# Patient Record
Sex: FEMALE | Race: BLACK OR AFRICAN AMERICAN
[De-identification: names, ages, dates, MRNs, and addresses within clinical notes are randomized per-mention and may not be internally consistent; named-entity substitution may affect disease eponyms.]

---

## 2017-06-10 NOTE — PD
HPI


Chief Complaint:  Pregnancy Related Problem


Time Seen by Provider:  09:36


Travel History


International Travel<30 days:  No


Contact w/Intl Traveler<30days:  No


Traveled to known affect area:  No





History of Present Illness


HPI


Patient is a 31-year-old  at approximately 10 weeks' gestational age based 

on early first trimester ultrasound here with complaint of nausea and vomiting.

  Patient has had hyperemesis with her previous pregnancies.  States she is 

followed by OB/GYN Dr. Graf, who prescribe Zofran 4 mg every 6 hours.  She 

has been taking this without much improvement and notes persistent nausea and 

vomiting.  She has not had any abdominal pain, vaginal bleeding or leakage of 

fluid.





PFSH


Past Medical History


Medical History:  Denies Significant Hx


Hx Anticoagulant Therapy:  No


Asthma:  Yes (CHILDHOOD)


Cardiovascular Problems:  No


Chemotherapy:  No


Cerebrovascular Accident:  No


Diabetes:  No


Diminished Hearing:  No


Respiratory:  No


Tetanus Vaccination:  > 5 Years


Influenza Vaccination:  No


Pregnant?:  Pregnant


:  2


Para:  2


Miscarriage:  0


:  0


Ectopic Pregnancy:  No


Tubal Ligation:  No





Past Surgical History


Surgical History:  No Previous Surgery


Genitourinary Surgery:  Yes (BARTHOLIN CYST; SEEMS NOT TO HAVE BEEN REMOVED)


Hysterectomy:  No





Social History


Alcohol Use:  No


Tobacco Use:  No


Substance Use:  No





Allergies-Medications


(Allergen,Severity, Reaction):  


Coded Allergies:  


     No Known Allergies (Verified , 16)


Reported Meds & Prescriptions





Reported Meds & Active Scripts


Active


Diclegis (Doxylamine-Pyridoxine) 10-10 Mg Tab 1 Tab PO AS DIRECTED 30 Days


     2 tab po hs day 1. If symptoms persist, 1 tab in am + 2 tab hs day 2.


     If symptoms persist, 1 tab in am + 1 tab noon + 2 tab hs day 3.


Phenergan (Promethazine HCl) 25 Mg Tablet 25 Mg PO Q6H PRN








Review of Systems


Except as stated in HPI:  all other systems reviewed are Neg





Physical Exam


Narrative


GENERAL: Well-appearing  female in no acute distress


SKIN: Focused skin assessment warm/dry.


HEAD:  Normocephalic. 


EYES: No scleral icterus. No injection or drainage. 


ENT:  Mucous membranes pink and moist.


CARDIOVASCULAR: Regular rate and rhythm


RESPIRATORY: No accessory muscle use.


GASTROINTESTINAL: Abdomen soft, non-tender, nondistended. 


MUSCULOSKELETAL: Normal gait


NEUROLOGICAL: Awake and alert.  Normal speech.


PSYCHIATRIC: Appropriate mood and affect; insight and judgment normal.





Data


Data


Last Documented VS





Vital Signs








  Date Time  Temp Pulse Resp B/P Pulse Ox O2 Delivery O2 Flow Rate FiO2


 


6/10/17 09:36  87 18 132/60 98 Room Air  


 


6/10/17 09:30 98.8       








Orders





 Promethazine (Phenergan) (6/10/17 09:45)








MDM


Medical Decision Making


Medical Screen Exam Complete:  Yes


Emergency Medical Condition:  Yes


Medical Record Reviewed:  Yes


Differential Diagnosis


31-year-old  at approximately 10 weeks' gestational age here with 

complaint of nausea, vomiting.  Differential includes pregnancy associated 

nausea, hyperemesis gravidarum.  She does not have any cyst and abdominal pain, 

fevers, chills or other systemic symptoms to suggest infection or peritoneal 

pathology.


Narrative Course


Patient given dose of oral Phenergan here with improvement of her symptoms.  We'

ll discharge to home with Phenergan, Diciglis alternating with Zofran when 

necessary for nausea vomiting outpatient BUN follow-up.





Diagnosis





 Primary Impression:  


 Hyperemesis gravidarum


Referrals:  


GOPAL Graf MD


call for appointment





***Additional Instructions:


Zofran, Phenergan as needed for nausea and vomiting.


Diclegis as prescribed.  Follow-up with OB/GYN as discussed.


***Med/Other Pt SpecificInfo:  Prescription(s) given


Scripts


Doxylamine-Pyridoxine (Diclegis)10-10 Mg Tab1 Tab PO AS DIRECTED  30 Days


   2 tab po hs day 1. If symptoms persist, 1 tab in am + 2 tab hs day 2.


   If symptoms persist, 1 tab in am + 1 tab noon + 2 tab hs day 3.


   Prov:Shiloh Marques MD         6/10/17 


Promethazine (Phenergan)25 Mg [id number] Mg PO Q6H PRN (NAUSEA OR VOMITING) #10 

TAB  Ref 0


   Prov:Shiloh Marques MD         6/10/17


Disposition:  01 DISCHARGE HOME


Condition:  Stable








Shiloh Marques MD Dayron 10, 2017 09:49

## 2017-07-16 ENCOUNTER — HOSPITAL ENCOUNTER (EMERGENCY)
Dept: HOSPITAL 17 - NEPK | Age: 32
Discharge: HOME | End: 2017-07-16
Payer: MEDICAID

## 2017-07-16 VITALS
SYSTOLIC BLOOD PRESSURE: 125 MMHG | HEART RATE: 78 BPM | TEMPERATURE: 99 F | RESPIRATION RATE: 20 BRPM | OXYGEN SATURATION: 99 % | DIASTOLIC BLOOD PRESSURE: 59 MMHG

## 2017-07-16 VITALS — WEIGHT: 171.96 LBS | HEIGHT: 60 IN | BODY MASS INDEX: 33.76 KG/M2

## 2017-07-16 DIAGNOSIS — Z79.899: ICD-10-CM

## 2017-07-16 DIAGNOSIS — J45.909: ICD-10-CM

## 2017-07-16 DIAGNOSIS — Z3A.15: ICD-10-CM

## 2017-07-16 DIAGNOSIS — M25.511: ICD-10-CM

## 2017-07-16 DIAGNOSIS — G89.29: ICD-10-CM

## 2017-07-16 DIAGNOSIS — O26.899: Primary | ICD-10-CM

## 2017-07-16 PROCEDURE — 99282 EMERGENCY DEPT VISIT SF MDM: CPT

## 2017-07-16 NOTE — PD
HPI


.


right shoulder pain


Chief Complaint:  Pain: Acute or Chronic


Time Seen by Provider:  18:14


Travel History


International Travel<30 days:  No


Contact w/Intl Traveler<30days:  No


Traveled to known affect area:  No





History of Present Illness


HPI


31-year-old female here with complaints of left shoulder pain, but is pointing 

to her right.  When we asked her which shoulder, she once again pointed to her 

right but was telling me her left.  I pointed that out to her and she tells me 

that several years ago she popped out her right shoulder and it has been 

hurting more than usual recently.  Of note she is 15 weeks pregnant, .  

She tells me Tylenol is not helping her pain and she would like something 

stronger.  She also wants to know if her shoulder is dislocated.





PFSH


Past Medical History


Hx Anticoagulant Therapy:  No


Asthma:  Yes (CHILDHOOD)


Cardiovascular Problems:  No


Chemotherapy:  No


Cerebrovascular Accident:  No


Diabetes:  No


Diminished Hearing:  No


Respiratory:  No


:  2


Para:  2


Miscarriage:  0


:  0


Ectopic Pregnancy:  No


Tubal Ligation:  No





Past Surgical History


Genitourinary Surgery:  Yes (BARTHOLIN CYST; SEEMS NOT TO HAVE BEEN REMOVED)


Hysterectomy:  No





Social History


Alcohol Use:  No


Tobacco Use:  No


Substance Use:  No





Allergies-Medications


(Allergen,Severity, Reaction):  


Coded Allergies:  


     No Known Allergies (Verified , 16)


Reported Meds & Prescriptions





Reported Meds & Active Scripts


Active


Diclegis (Doxylamine-Pyridoxine) 10-10 Mg Tab 1 Tab PO AS DIRECTED 30 Days


     2 tab po hs day 1. If symptoms persist, 1 tab in am + 2 tab hs day 2.


     If symptoms persist, 1 tab in am + 1 tab noon + 2 tab hs day 3.


Phenergan (Promethazine HCl) 25 Mg Tablet 25 Mg PO Q6H PRN








Review of Systems


General / Constitutional:  No: Fever


Eyes:  No: Visual changes


HENT:  No: Headaches


Cardiovascular:  No: Chest Pain or Discomfort


Respiratory:  No: Shortness of Breath


Gastrointestinal:  No: Abdominal Pain


Genitourinary:  No: Dysuria


Musculoskeletal:  Positive: Pain (right shoulder pain )


Skin:  No Rash


Neurologic:  No: Weakness


Psychiatric:  No: Depression


Endocrine:  No: Polydipsia


Hematologic/Lymphatic:  No: Easy Bruising





Physical Exam


Narrative


GENERAL: AAO x 3, no acute distress, Well-nourished, well-developed patient.


SKIN: Warm and dry. No visible rashes or bruising. 


HEAD: Normocephalic and atraumatic.


EYES: No scleral icterus. No injection or drainage. 


ENT: No nasal drainage noted. Mucous membranes pink. Airway patent. 


NECK: Supple, trachea midline. No JVD.


CARDIOVASCULAR: Regular rate and rhythm without murmurs, gallops, or rubs. 


RESPIRATORY: Breath sounds equal bilaterally. No accessory muscle use. No 

rhonchi or rales. 


GASTROINTESTINAL:visual inspection normal 


EXTREMITIES: No cyanosis or edema. Can cross both arms across chest, ROM b/l 

shoulder normal. 


BACK: No obvious deformity.


NEURO: CN II-12 intact, 


PSYCH: AAO x 3, normal affect.





Data


Data


Last Documented VS





Vital Signs








  Date Time  Temp Pulse Resp B/P Pulse Ox O2 Delivery O2 Flow Rate FiO2


 


17 17:56 99.0 78 20 125/59 99 Room Air  











MDM


Medical Decision Making


Medical Screen Exam Complete:  Yes


Emergency Medical Condition:  Yes


Medical Record Reviewed:  Yes


Differential Diagnosis


Acute on chronic shoulder pain, less likely dislocation, less likely fracture


Narrative Course


31-year-old female here with acute on chronic shoulder pain.


Patient appears comfortable and is in no distress.  Examination is unremarkable.


I advised her she can use Tylenol or speak with her OB/GYN about stronger pain 

medication.





I've explained to her that ultimately there is nothing else I can do in the 

emergency room.  She does not have a shoulder dislocation and does not require 

further treatment.





Patient verbalized understanding of instructions, questions were answered, and 

thanked me for their care. I advised them if their condition worsens, please 

return to the nearest emergency room for further care.





Diagnosis





 Primary Impression:  


 Shoulder pain, right


 Qualified Code:  M25.511 - Chronic right shoulder pain


Patient Instructions:  General Instructions





***Additional Instructions:


Follow-up with her primary care provider.





Follow-up with your OB/GYN.


***Med/Other Pt SpecificInfo:  No Change to Meds


Disposition:  01 DISCHARGE HOME


Condition:  Stable








Lilliana Miles 2017 18:15

## 2017-08-02 ENCOUNTER — HOSPITAL ENCOUNTER (EMERGENCY)
Dept: HOSPITAL 17 - PHED | Age: 32
Discharge: HOME | End: 2017-08-02
Payer: MEDICAID

## 2017-08-02 VITALS — HEIGHT: 60 IN | WEIGHT: 220.46 LBS | BODY MASS INDEX: 43.28 KG/M2

## 2017-08-02 VITALS
TEMPERATURE: 98.4 F | SYSTOLIC BLOOD PRESSURE: 108 MMHG | OXYGEN SATURATION: 100 % | HEART RATE: 68 BPM | RESPIRATION RATE: 16 BRPM | DIASTOLIC BLOOD PRESSURE: 57 MMHG

## 2017-08-02 DIAGNOSIS — R42: ICD-10-CM

## 2017-08-02 DIAGNOSIS — R82.71: ICD-10-CM

## 2017-08-02 DIAGNOSIS — Z3A.17: ICD-10-CM

## 2017-08-02 DIAGNOSIS — O26.892: Primary | ICD-10-CM

## 2017-08-02 LAB
ALP SERPL-CCNC: 60 U/L (ref 45–117)
ALT SERPL-CCNC: 19 U/L (ref 10–53)
ANION GAP SERPL CALC-SCNC: 9 MEQ/L (ref 5–15)
AST SERPL-CCNC: 14 U/L (ref 15–37)
BACTERIA #/AREA URNS HPF: (no result) /HPF
BASOPHILS # BLD AUTO: 0 TH/MM3 (ref 0–0.2)
BASOPHILS NFR BLD: 0.4 % (ref 0–2)
BILIRUB SERPL-MCNC: 0.3 MG/DL (ref 0.2–1)
BUN SERPL-MCNC: 8 MG/DL (ref 7–18)
CHLORIDE SERPL-SCNC: 107 MEQ/L (ref 98–107)
COLOR UR: YELLOW
COMMENT (UR): (no result)
CULTURE IF INDICATED: (no result)
EOSINOPHIL # BLD: 0.2 TH/MM3 (ref 0–0.4)
EOSINOPHIL NFR BLD: 2 % (ref 0–4)
ERYTHROCYTE [DISTWIDTH] IN BLOOD BY AUTOMATED COUNT: 13.4 % (ref 11.6–17.2)
GFR SERPLBLD BASED ON 1.73 SQ M-ARVRAT: 140 ML/MIN (ref 89–?)
GLUCOSE UR STRIP-MCNC: (no result) MG/DL
HCO3 BLD-SCNC: 24 MEQ/L (ref 21–32)
HCT VFR BLD CALC: 35.3 % (ref 35–46)
HEMO FLAGS: (no result)
HGB UR QL STRIP: (no result)
KETONES UR STRIP-MCNC: (no result) MG/DL
LEUKOCYTE ESTERASE UR QL STRIP: (no result) /HPF (ref 0–5)
LYMPHOCYTES # BLD AUTO: 3.4 TH/MM3 (ref 1–4.8)
LYMPHOCYTES NFR BLD AUTO: 28.7 % (ref 9–44)
MCH RBC QN AUTO: 30 PG (ref 27–34)
MCHC RBC AUTO-ENTMCNC: 33.1 % (ref 32–36)
MCV RBC AUTO: 90.7 FL (ref 80–100)
MONOCYTES NFR BLD: 5.6 % (ref 0–8)
NEUTROPHILS # BLD AUTO: 7.6 TH/MM3 (ref 1.8–7.7)
NEUTROPHILS NFR BLD AUTO: 63.3 % (ref 16–70)
NITRITE UR QL STRIP: (no result)
PLATELET # BLD: 342 TH/MM3 (ref 150–450)
POTASSIUM SERPL-SCNC: 3.8 MEQ/L (ref 3.5–5.1)
RBC # BLD AUTO: 3.89 MIL/MM3 (ref 4–5.3)
RBC #/AREA URNS HPF: (no result) /HPF (ref 0–3)
SODIUM SERPL-SCNC: 140 MEQ/L (ref 136–145)
SP GR UR STRIP: 1.03 (ref 1–1.03)
SQUAMOUS #/AREA URNS HPF: (no result) /HPF (ref 0–5)
WBC # BLD AUTO: 11.9 TH/MM3 (ref 4–11)

## 2017-08-02 PROCEDURE — 81001 URINALYSIS AUTO W/SCOPE: CPT

## 2017-08-02 PROCEDURE — 85025 COMPLETE CBC W/AUTO DIFF WBC: CPT

## 2017-08-02 PROCEDURE — 80053 COMPREHEN METABOLIC PANEL: CPT

## 2017-08-02 PROCEDURE — 96360 HYDRATION IV INFUSION INIT: CPT

## 2017-08-02 PROCEDURE — 99284 EMERGENCY DEPT VISIT MOD MDM: CPT

## 2017-08-02 NOTE — PD
HPI


Chief Complaint:  Pregnancy Related Problem


Time Seen by Provider:  12:30


Travel History


International Travel<30 days:  No


Contact w/Intl Traveler<30days:  No


Traveled to known affect area:  No





History of Present Illness


HPI


This is a 31-year-old female who presents to the emergency department 17 weeks 

pregnant not feeling well.  She says that at work her lower abdomen started to 

cramp and she started to feel lightheaded, moderate severity, constant.  She 

says she's been having these symptoms for 3 weeks.  She told her obstetrician 

about it.  He didn't say much.  She says during her second pregnancy she also 

had similar symptoms.  She just says she feels really tired and doesn't feel 

well.  She denies any vaginal bleeding, dysuria, diarrhea, vaginal discharge or 

fever





PFSH


Past Medical History


Hx Anticoagulant Therapy:  No


Asthma:  Yes (CHILDHOOD)


Cardiovascular Problems:  No


Chemotherapy:  No


Cerebrovascular Accident:  No


Diabetes:  No


Diminished Hearing:  No


Respiratory:  No


Pregnant?:  Pregnant


LMP:  17 WEEKS PREGNANT


:  3


Para:  2


Miscarriage:  0


:  0


Ectopic Pregnancy:  No


Tubal Ligation:  No





Past Surgical History


Genitourinary Surgery:  Yes (BARTHOLIN CYST; SEEMS NOT TO HAVE BEEN REMOVED)


Hysterectomy:  No





Social History


Alcohol Use:  No


Tobacco Use:  No


Substance Use:  No





Allergies-Medications


(Allergen,Severity, Reaction):  


Coded Allergies:  


     No Known Allergies (Verified , 17)


Reported Meds & Prescriptions





Reported Meds & Active Scripts


Active


Diclegis (Doxylamine-Pyridoxine) 10-10 Mg Tab 1 Tab PO AS DIRECTED 30 Days


     2 tab po hs day 1. If symptoms persist, 1 tab in am + 2 tab hs day 2.


     If symptoms persist, 1 tab in am + 1 tab noon + 2 tab hs day 3.


Reported


Prenatal Dha (Docosahexaenoic Acid) 200 Mg Cap   








Review of Systems


Except as stated in HPI:  all other systems reviewed are Neg





Physical Exam


Narrative


GENERAL:Well appearing, no acute distress


SKIN: Focused skin assessment warm and dry.


HEAD: Atraumatic. Normocephalic. 


EYES: Pupils equal and round.  No injection or drainage. 


ENT:  Moist mucous membranes


NECK: Trachea midline. 


CARDIOVASCULAR: Regular rate and rhythm.  No murmur appreciated.


RESPIRATORY: Clear to auscultation. Breath sounds equal bilaterally. 


GASTROINTESTINAL: Abdomen soft, mildly tender to palpation in the lower abdomen 

with no rebound or guarding.


MUSCULOSKELETAL: No obvious deformities. 


NEUROLOGICAL: Awake and alert. No obvious cranial nerve deficits.  Moving all 

extremities.


PSYCHIATRIC: Appropriate mood and affect; insight and judgment normal.





Data


Data


Last Documented VS





Vital Signs








  Date Time  Temp Pulse Resp B/P Pulse Ox O2 Delivery O2 Flow Rate FiO2


 


17 12:23 98.4 68 16 108/57 100   








Orders





 Complete Blood Count With Diff (17 12:40)


Comprehensive Metabolic Panel (17 12:40)


^ Insert Iv (17 12:40)


Sodium Chlor 0.9% 1000 Ml Inj (Ns 1000 M (17 12:45)


Urinalysis - C+S If Indicated (17 12:40)


Ed Poc Ultrasound (17 )





Labs








 Laboratory Tests








Test 17





 12:50


 


White Blood Count 11.9 TH/MM3


 


Red Blood Count 3.89 MIL/MM3


 


Hemoglobin 11.7 GM/DL


 


Hematocrit 35.3 %


 


Mean Corpuscular Volume 90.7 FL


 


Mean Corpuscular Hemoglobin 30.0 PG


 


Mean Corpuscular Hemoglobin 33.1 %





Concent 


 


Red Cell Distribution Width 13.4 %


 


Platelet Count 342 TH/MM3


 


Mean Platelet Volume 8.0 FL


 


Neutrophils (%) (Auto) 63.3 %


 


Lymphocytes (%) (Auto) 28.7 %


 


Monocytes (%) (Auto) 5.6 %


 


Eosinophils (%) (Auto) 2.0 %


 


Basophils (%) (Auto) 0.4 %


 


Neutrophils # (Auto) 7.6 TH/MM3


 


Lymphocytes # (Auto) 3.4 TH/MM3


 


Monocytes # (Auto) 0.7 TH/MM3


 


Eosinophils # (Auto) 0.2 TH/MM3


 


Basophils # (Auto) 0.0 TH/MM3


 


CBC Comment DIFF FINAL 


 


Differential Comment  


 


Urine Color YELLOW 


 


Urine Turbidity CLEAR 


 


Urine pH 6.0 


 


Urine Specific Gravity 1.031 


 


Urine Protein NEG mg/dL


 


Urine Glucose (UA) NEG mg/dL


 


Urine Ketones NEG mg/dL


 


Urine Occult Blood NEG 


 


Urine Nitrite NEG 


 


Urine Bilirubin NEG 


 


Urine Leukocyte Esterase NEG 


 


Urine RBC 0-3 /hpf


 


Urine WBC 0-2 /hpf


 


Urine Squamous Epithelial 0-5 /hpf





Cells 


 


Urine Amorphous Sediment FEW 


 


Urine Bacteria FEW /hpf


 


Microscopic Urinalysis Comment CULT NOT





 INDICATED


 


Sodium Level 140 MEQ/L


 


Potassium Level 3.8 MEQ/L


 


Chloride Level 107 MEQ/L


 


Carbon Dioxide Level 24.0 MEQ/L


 


Anion Gap 9 MEQ/L


 


Blood Urea Nitrogen 8 MG/DL


 


Creatinine 0.60 MG/DL


 


Estimat Glomerular Filtration 140 ML/MIN





Rate 


 


Random Glucose 69 MG/DL


 


Calcium Level 9.0 MG/DL


 


Total Bilirubin 0.3 MG/DL


 


Aspartate Amino Transf 14 U/L





(AST/SGOT) 


 


Alanine Aminotransferase 19 U/L





(ALT/SGPT) 


 


Alkaline Phosphatase 60 U/L


 


Total Protein 7.1 GM/DL


 


Albumin 2.8 GM/DL














MDM


Medical Decision Making


Medical Screen Exam Complete:  Yes


Emergency Medical Condition:  Yes


Interpretation(s)


Afebrile, no tachycardia, normotensive


Mild leukocytosis


Electrolytes are reassuring


Urinalysis: Few bacteria


Differential Diagnosis


Dehydration, electrolyte abnormality, urinary tract infection


Narrative Course


This is a 31-year-old female who presents to the emergency department with 

fatigue, lightheadedness and abdominal discomfort in the setting of pregnancy.  

She says she's been having these symptoms for 3 weeks and she had them in her 

prior pregnancy as well.  I don't suspect a surgical etiology for her symptoms 

and she is quite well-appearing.  Labs are obtained which are reassuring.  She 

does have a few bacteria on urinalysis so I'll treat her with antibiotics.  She 

is given a liter of fluids and feels much better.  I think she can be 

discharged home to follow up with her obstetrician.





Procedures


**Procedure Narrative**


Point-of-care ultrasound: Active intrauterine fetus with a heart rate of 132





Diagnosis





 Primary Impression:  


 Pregnancy


 Qualified Code:  Z3A.17 - 17 weeks gestation of pregnancy


 Additional Impression:  


 Asymptomatic bacteriuria


Patient Instructions:  General Instructions





***Additional Instructions:


If you develop fever, persistent vomiting, back pain, or inability to eat 

return to the emergency department as your urine infection may have progressed 

to a kidney infection.


Complete your antibiotics as prescribed.


Stay well hydrated with Gatorade or water.


Followup with your primary care physician in 2-3 days if your symptoms have not 

resolved.


***Med/Other Pt SpecificInfo:  Prescription(s) given


Scripts


Cephalexin (Keflex)500 Mg [id number] Mg PO Q12H  7 Days  Ref 0


   Prov:Oliva Armas MD         17


Disposition:  01 DISCHARGE HOME


Condition:  Stable








Oliva Armas MD Aug 2, 2017 12:50

## 2017-09-20 ENCOUNTER — HOSPITAL ENCOUNTER (EMERGENCY)
Dept: HOSPITAL 17 - HOBED | Age: 32
Discharge: HOME | End: 2017-09-20
Payer: MEDICAID

## 2017-09-20 DIAGNOSIS — M54.5: ICD-10-CM

## 2017-09-20 DIAGNOSIS — O21.9: Primary | ICD-10-CM

## 2017-09-20 DIAGNOSIS — R10.30: ICD-10-CM

## 2017-09-20 PROCEDURE — 96374 THER/PROPH/DIAG INJ IV PUSH: CPT

## 2017-09-20 PROCEDURE — 99284 EMERGENCY DEPT VISIT MOD MDM: CPT

## 2017-09-20 NOTE — PD
HPI


Chief Complaint


Cramping pain lower abdominal and low back pain nausea and vomiting


Date Seen:  Sep 20, 2017


Time Seen:  11:10


Travel History


International Travel<30 Days:  No


Contact w/Intl Traveler<30Days:  No


Known Affected Area:  No





History of Present Illness


HPI


Patient is 32-year-old black female  24 weeks sees Dr. Graf for 

prenatal care she presents complaining of long-term lower abdominal pain and 

cramping for months and also lower back pain long-term but today and yesterday 

particularly bad.  She denies leakage or bleeding.  The fetal heart tones are 

reactive she is not jenna, urine dip is negative, significant nausea and 

vomiting throughout her pregnancy is unlikely just at home that's her only 

medication.  She tried some Tylenol as well as home today


Weeks Gestation:  24


Para:  2


:  3





History


Obstetric History


Obstetric History


2 vaginal deliveries





Social History


Alcohol Use:  No


Tobacco Use:  No


Substance Abuse:  No





Allergies-Medications


(Allergen,Severity, Reaction):  


Coded Allergies:  


     No Known Allergies (Verified , 17)


Home Meds


Active Scripts


Cephalexin (Keflex) 500 Mg Cap, 500 MG PO Q12H for Infection for 7 Days, CAP 0 

Refills


   Prov:Oliva Armas MD         17


Doxylamine-Pyridoxine (Diclegis) 10-10 Mg Tab, 1 TAB PO AS DIRECTED for 30 Days


   2 tab po hs day 1. If symptoms persist, 1 tab in am + 2 tab hs day 2.


   If symptoms persist, 1 tab in am + 1 tab noon + 2 tab hs day 3.


   Prov:Shiloh Marques MD         6/10/17


Reported Medications


Docosahexaenoic Acid (Prenatal Dha) 200 Mg Cap


   17





Review of Systems


General / Constitutional:  No: Fever, Weight Gain, Chills, Other


Eyes:  No: Diploplia, Blurred Vision, Visual changes, Pain, Photophobia


HENT:  No: Headaches, Vertigo, Lightheadedness


Cardiovascular:  No: Irregular Rhythm, Chest Pain or Discomfort, Palpitations, 

Tachycardia, Syncope, Varicosities, Edema, Cyanosis


Respiratory:  No: Cough, Short of Breath, Other


Gastrointestinal:  Nausea, Vomiting, Abdominal Pain, No: Diarrhea


Genitourinary:  No: Decreased Urinary Output, Oliguria


Musculoskeletal:  No: Limited ROM, Weakness, Cramping, Edema, Pain


Skin:  No Rash, No Itching, No Dryness, No Lumps, No Change in Pigmentation, No 

Change in Nails, No Alopecia, No Lesions


Neurologic:  No: Weakness, Dizziness, Syncope, Focal Abnormalities, 

Coordination Problem, Headache, Slurred Speech, Seizures


Psychiatric:  No: Depression, Suicidal Ideations, Homicidal Ideation


Endocrine:  No: Heat Intolerance, Cold Intolerance, Polydipsia, Polyuria, Other





Physical Exam


Narrative


GENERAL: Well-nourished, well-developed patient.


SKIN: Warm and dry.


HEAD: Normocephalic and atraumatic.


EYES: No scleral icterus. No injection or drainage. 


ENT: No nasal drainage noted. Mucous membranes pink. Airway patent.


NECK: Supple, trachea midline. No JVD.


CARDIOVASCULAR: Regular rate and rhythm without murmurs, gallops, or rubs. 


RESPIRATORY: Breath sounds equal bilaterally. No accessory muscle use.


BREASTS: Bilateral exam showed no masses , no retractions, no nipple discharge.


ABDOMEN/GI: Abdomen soft, non-tender, bowel sounds present, no rebound, no 

guarding 


   Gravid to [-24] weeks size


   Fundal Height: [24-]


GENITOURINARY: 


   External Genitalia: intact and normal in appearance


   BUS glands: [-]


   Cervix: [-]


   Dilatation: [Closed-]          


   Effacement: [-]  Thick        


   Station: [-3]  


    -]        


   Membranes: [intact  ]


   Uterine Contractions: [none-]


FHT's: 


   Category: [1-]   


   Baseline: [133-]   


   Reactive: [yes-]   


   Variability: [-mod]  


   Decels: [-none]  


EXTREMITIES: No cyanosis or edema.


BACK: Nontender without obvious deformity. No CVA tenderness.


NEUROLOGICAL: Awake and alert. Motor and sensory grossly within normal limits. 

Five out of 5 muscle strength in all muscle groups. Normal speech.





Data


Data


Orders





 Orders


Vital Signs (Adult) .ON ADMISSION (17 11:12)


^ Labor Status (17 11:12)


Lactated Ringer's 1000 Ml Inj (Lr 1000 M (17 11:12)


Ondansetron Inj (Zofran Inj) (17 11:15)


Fentanyl Inj (Fentanyl Inj) (17 11:15)


Labs


Urine dip positive for ketones otherwise negative





MDM


Interpretation(s)


Patient is 32-year-old black female  at 24 weeks presents complaining of 

lower abdominal pain and low back pain and nausea and vomiting.  She has no 

bleeding or leakage of fluid no contractions and fetal heart rate is reactive, 

cervix is closed thick and high, urinalysis is negative on dipstick, patient's 

had the similar cramps and low back pain the whole pregnancy assisted was 

little worse yesterday.  She took some Tylenol and did not get better


Plan


Plan is IV hydrate IV Zofran.  The patient wants pain medication she'll find a 

ride home she drove herself to the hospital.  Home prescriptions for Phenergan 

by mouth and suppository.  Upper follow-up with Dr. Graf in the usual 

fashion sooner when necessary symptoms


Diagnosis


Diagnosis:  


 Primary Impression:  


 Abdominal pain during pregnancy in second trimester


 Additional Impression:  


 Nausea and vomiting of pregnancy, antepartum


Disposition:  01 DISCHARGE HOME


Condition:  Stable


Scripts


Promethazine Supp (Phenergan Supp) 25 Mg Supp


25 MG RECTAL Q6H Y for NAUSEA OR VOMITING, #6 SUPP 0 Refills


   Prov: Bar Meyers II, MD         17 


Promethazine (Phenergan) 25 Mg Tablet


25 MG PO Q6H Y for NAUSEA OR VOMITING, #30 TAB 0 Refills


   Prov: Bar Meyers II, MD         17











Bar Meyers II, MD Sep 20, 2017 11:22

## 2017-10-03 ENCOUNTER — HOSPITAL ENCOUNTER (EMERGENCY)
Dept: HOSPITAL 17 - HOBED | Age: 32
Discharge: HOME | End: 2017-10-03
Payer: MEDICAID

## 2017-10-03 DIAGNOSIS — Z3A.26: ICD-10-CM
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
## 2017-10-03 DIAGNOSIS — O9A.212: Primary | ICD-10-CM

## 2017-10-03 PROCEDURE — 99283 EMERGENCY DEPT VISIT LOW MDM: CPT

## 2017-10-03 NOTE — PD
HPI


Chief Complaint


Patient fell at home against the bathtub


Date Seen:  Oct 3, 2017


Time Seen:  19:25


Travel History


International Travel<30 Days:  No


Contact w/Intl Traveler<30Days:  No


Known Affected Area:  No





History of Present Illness


HPI


Patient is 32-year-old black female  at 26 weeks sees Dr. Graf she 

presents after having fallen at home and hit her side against the bathtub.  She 

has had some tenderness now but has had no bleeding or leakage of fluid.  Fetal 

heart tones are within normal limits 26 weeks sees a little hard to monitor.  

No contractions seen


Weeks Gestation:  26


Para:  2


:  3





History


Obstetric History


Obstetric History


2 vaginal deliveries





Social History


Alcohol Use:  No


Tobacco Use:  No


Substance Abuse:  No





Allergies-Medications


(Allergen,Severity, Reaction):  


Coded Allergies:  


     No Known Allergies (Verified , 17)


Home Meds


Active Scripts


Promethazine Supp (Phenergan Supp) 25 Mg Supp, 25 MG RECTAL Q6H Y for NAUSEA OR 

VOMITING, #6 SUPP 0 Refills


   Prov:Bar Meyers II, MD         17


Promethazine (Phenergan) 25 Mg Tablet, 25 MG PO Q6H Y for NAUSEA OR VOMITING, #

30 TAB 0 Refills


   Prov:Bar Meyers II, MD         17


Cephalexin (Keflex) 500 Mg Cap, 500 MG PO Q12H for Infection for 7 Days, CAP 0 

Refills


   Prov:Oliva Armas MD         17


Doxylamine-Pyridoxine (Diclegis) 10-10 Mg Tab, 1 TAB PO AS DIRECTED for 30 Days


   2 tab po hs day 1. If symptoms persist, 1 tab in am + 2 tab hs day 2.


   If symptoms persist, 1 tab in am + 1 tab noon + 2 tab hs day 3.


   Prov:Shiloh Marques MD         6/10/17


Reported Medications


Docosahexaenoic Acid (Prenatal Dha) 200 Mg Cap


   17





Review of Systems


General / Constitutional:  No: Fever, Weight Gain, Chills, Other


Eyes:  No: Diploplia, Blurred Vision, Visual changes, Pain, Photophobia


HENT:  No: Headaches, Vertigo, Lightheadedness


Cardiovascular:  No: Irregular Rhythm, Chest Pain or Discomfort, Palpitations, 

Tachycardia, Syncope, Varicosities, Edema, Cyanosis


Respiratory:  No: Cough, Short of Breath, Other


Gastrointestinal:  No: Nausea, Vomiting, Diarrhea


Genitourinary:  No: Decreased Urinary Output, Oliguria


Musculoskeletal:  No: Limited ROM, Weakness, Cramping, Edema, Pain


Skin:  No Rash, No Itching, No Dryness, No Lumps, No Change in Pigmentation, No 

Change in Nails, No Alopecia, No Lesions


Neurologic:  No: Weakness, Dizziness, Syncope, Focal Abnormalities, 

Coordination Problem, Headache, Slurred Speech, Seizures


Psychiatric:  No: Depression, Suicidal Ideations, Homicidal Ideation


Endocrine:  No: Heat Intolerance, Cold Intolerance, Polydipsia, Polyuria, Other





Physical Exam


Narrative


GENERAL: Well-nourished, well-developed obese patient.


SKIN: Warm and dry.


HEAD: Normocephalic and atraumatic.


EYES: No scleral icterus. No injection or drainage. 


ENT: No nasal drainage noted. Mucous membranes pink. Airway patent.


NECK: Supple, trachea midline. No JVD.


CARDIOVASCULAR: Regular rate and rhythm without murmurs, gallops, or rubs. 


RESPIRATORY: Breath sounds equal bilaterally. No accessory muscle use.


BREASTS: Bilateral exam showed no masses , no retractions, no nipple discharge.


ABDOMEN/GI: Abdomen soft, non-tender, bowel sounds present, no rebound, no 

guarding 


   Gravid to [26-] weeks size


   Fundal Height: [-26]


GENITOURINARY: 


 


   Membranes: [intact ]


   Uterine Contractions: [-none]


FHT's: 


   Category: [1-]   


   Baseline: [133-]   


   Reactive: [-yes for 26 weeks]   


   Variability: [-mod]  


   Decels: [-0]  


EXTREMITIES: No cyanosis or edema.


BACK: Nontender without obvious deformity. No CVA tenderness.


NEUROLOGICAL: Awake and alert. Motor and sensory grossly within normal limits. 

Five out of 5 muscle strength in all muscle groups. Normal speech.





MDM


Interpretation(s)


Patient is 32-year-old black female  at 26 weeks who fell at home with 

this evening and hit her side against the bathtub.  She  has tenderness on that 

side but otherwise no other injury, there is no bruising ,breakage of the skin, 

lesions seen.  Fetal heart rate is reactive at 26 weeks.  No contractions seen


Plan


Plan is to observe for 1 hour and if the baby looks with THE MONITOR DURING 

THAT TIME WILL DISCHARGE HOME TO BEDREST


Diagnosis


Diagnosis:  


 Primary Impression:  


 Fall


 Additional Impression:  


 26 weeks gestation of pregnancy


Disposition:  01 DISCHARGE HOME


Condition:  Stable











Bar Meyers II, MD Oct 3, 2017 19:43

## 2017-10-19 ENCOUNTER — HOSPITAL ENCOUNTER (EMERGENCY)
Dept: HOSPITAL 17 - HOBED | Age: 32
Discharge: HOME | End: 2017-10-19
Payer: MEDICAID

## 2017-10-19 DIAGNOSIS — Z3A.28: ICD-10-CM

## 2017-10-19 DIAGNOSIS — K52.9: ICD-10-CM

## 2017-10-19 DIAGNOSIS — O99.613: Primary | ICD-10-CM

## 2017-10-19 PROCEDURE — 99284 EMERGENCY DEPT VISIT MOD MDM: CPT

## 2017-10-19 NOTE — PD
HPI


Chief Complaint


cramping and diarrhea


Date Seen:  Oct 19, 2017


Time Seen:  21:45


Travel History


International Travel<30 Days:  No


Contact w/Intl Traveler<30Days:  No


Known Affected Area:  No





History of Present Illness


HPI


PT is a 31 y/o  with IUP at 28.6 wks who presents for evaluation of 

abdominal cramping and diarrhea.  PT states that after work, she had intense 

abdominal pain/cramping, followed by diarrhea.  She subsequently had another 

episode of diarrhea.  Pt denies n/v.  She is tolerating po liquids but reports 

decreased appetite.  denies fever, sick contacts. denies dysuria.  Pt states 

she ate at [employer] for lunch.


Pt denies contractions, vb, lof.  +FM


Weeks Gestation:  28


Para:  2


:  3





History


Past Medical History


Medical History:  Denies Significant Hx





Obstetric History


Obstetric History





  FTSVD


  FTSVD





Past Surgical History


*** Narrative Surgical


bartholin's cyst I





Family History


Family History:  Negative





Social History


Alcohol Use:  No


Tobacco Use:  No


Substance Abuse:  No





Allergies-Medications


(Allergen,Severity, Reaction):  


Coded Allergies:  


     No Known Allergies (Verified , 17)


Home Meds


Active Scripts


Promethazine Supp (Phenergan Supp) 25 Mg Supp, 25 MG RECTAL Q6H Y for NAUSEA OR 

VOMITING, #6 SUPP 0 Refills


   Prov:Bar Meyers II, MD         17


Promethazine (Phenergan) 25 Mg Tablet, 25 MG PO Q6H Y for NAUSEA OR VOMITING, #

30 TAB 0 Refills


   Prov:Bar Meyers II, MD         17


Cephalexin (Keflex) 500 Mg Cap, 500 MG PO Q12H for Infection for 7 Days, CAP 0 

Refills


   Prov:Oliva Armas MD         17


Doxylamine-Pyridoxine (Diclegis) 10-10 Mg Tab, 1 TAB PO AS DIRECTED for 30 Days


   2 tab po hs day 1. If symptoms persist, 1 tab in am + 2 tab hs day 2.


   If symptoms persist, 1 tab in am + 1 tab noon + 2 tab hs day 3.


   Prov:Shiloh Marques MD         6/10/17


Reported Medications


Docosahexaenoic Acid (Prenatal Dha) 200 Mg Cap


   8/2/17





Review of Systems


General / Constitutional:  No: Fever, Chills


Eyes:  No: Diploplia, Blurred Vision, Visual changes, Pain, Photophobia, Other


HENT:  No: Headaches, Vertigo, Dental Difficulties, Lightheadedness, Other


Cardiovascular:  No: Irregular Rhythm, Chest Pain or Discomfort, Palpitations, 

Tachycardia, Syncope, Varicosities, Edema, Cyanosis, Other


Respiratory:  No: Cough, Short of Breath, Wheezing, Other


Gastrointestinal:  Diarrhea, Abdominal Pain


Genitourinary:  No: Urgency, Frequency, Dysuria, Nocturia, Hematuria, Decreased 

Urinary Output, Oliguria, Hesitancy, Dribbling, Incontinence, Pelvic Pain, 

Dyspareunia, Discharge, Menorrhagia, Vaginal Bleeding, Other


Musculoskeletal:  No: Limited ROM, Weakness, Cramping, Edema, Pain, Other


Skin:  No Rash, No Itching, No Dryness, No Lumps, No Change in Pigmentation, No 

Change in Nails, No Alopecia, No Lesions, No Breast Lumps, No Breast Tenderness

, No Breast Swelling, No Other


Neurologic:  No: Weakness, Dizziness, Syncope, Focal Abnormalities, 

Coordination Problem, Headache, Slurred Speech, Seizures, Other


Psychiatric:  No: Anxiety, Depression, Suicidal Ideations, Disorder of Thought, 

Mood Disorder, Substance Abuse, Homicidal Ideation, Other


Endocrine:  No: Heat Intolerance, Cold Intolerance, Polydipsia, Polyuria, Other


Hematologic/Lymphatic:  No Easy Bruising, No Lymph Node Enlargement, No Other





Physical Exam


118/57, 78, 18, 98.6


Narrative


GENERAL: Well-nourished, well-developed patient.


SKIN: Warm and dry.


HEAD: Normocephalic and atraumatic.


EYES: No scleral icterus. No injection or drainage. 


ENT: No nasal drainage noted. Mucous membranes pink. Airway patent.


NECK: Supple, trachea midline. No JVD.


CARDIOVASCULAR: Regular rate and rhythm without murmurs, gallops, or rubs. 


RESPIRATORY: Breath sounds equal bilaterally. No accessory muscle use.





ABDOMEN/GI: Abdomen soft, non-tender, bowel sounds present, no rebound, no 

guarding 


   Gravid 


GENITOURINARY: 


   External Genitalia: intact and normal in appearance


   BUS glands: [wnl]


   Cervix: post


   Dilatation: closed          


   Effacement: thick          


   Station: high  


   Presentation: -        


   Membranes: intact


   Uterine Contractions: none


FHT's: 


   Category: -   


   Baseline: 120   


   Reactive: no, reassuring for gest age   


   Variability: mod  


   Decels: -  


EXTREMITIES: No cyanosis or edema.


BACK: Nontender without obvious deformity. No CVA tenderness.


NEUROLOGICAL: Awake and alert. Motor and sensory grossly within normal limits. 

Five out of 5 muscle strength in all muscle groups. Normal speech.





Data


Data


Vital Signs Reviewed:  Yes


Orders





 Orders


Vital Signs (Adult) .ON ADMISSION (10/19/17 21:42)


^ Labor Status (10/19/17 21:42)


^ Hydration (10/19/17 21:42)


Loperamide (Imodium) (10/19/17 21:45)


Labs


urine dip:  trace protein, otherwise neg





MDM


Medical Record Reviewed:  Yes


Narrative Course / MDM


31 y/o  with IUP at 28.6 wks with diarrhea and cramping


--suspect gastroenteritis (versus food intolerance/food poisoning)


--pt able to tolerate PO


--immodium x 1 now


--advised rest, hydration


--return if symptoms worsen/persist


Plan


d/c home


Diagnosis


Diagnosis:  


 Primary Impression:  


 Gastroenteritis


 Additional Impression:  


 28 weeks gestation of pregnancy


Disposition:  01 DISCHARGE HOME


Condition:  Stable


Patient Instructions:  Abdominal Pain in Pregnancy (ED), Fetal Movement (ED)











Katelin Huang MD Oct 19, 2017 22:04

## 2017-10-19 NOTE — PD
HPI


Chief Complaint


cramping and diarrhea


Date Seen:  Oct 19, 2017


Time Seen:  21:45


Travel History


International Travel<30 Days:  No


Contact w/Intl Traveler<30Days:  No


Known Affected Area:  No





History of Present Illness


HPI


PT is a 31 y/o  with IUP at 28.6 wks who presents for evaluation of 

abdominal cramping and diarrhea.  PT states that after work, she had intense 

abdominal pain/cramping, followed by diarrhea.  She subsequently had another 

episode of diarrhea.  Pt denies n/v.  She is tolerating po liquids but reports 

decreased appetite.  denies fever, sick contacts. denies dysuria.  Pt states 

she ate at [employer] for lunch.


Pt denies contractions, vb, lof.  +FM


Weeks Gestation:  28


Para:  2


:  3





History


Past Medical History


Medical History:  Denies Significant Hx





Obstetric History


Obstetric History





  FTSVD


  FTSVD





Past Surgical History


*** Narrative Surgical


bartholin's cyst I





Family History


Family History:  Negative





Social History


Alcohol Use:  No


Tobacco Use:  No


Substance Abuse:  No





Allergies-Medications


(Allergen,Severity, Reaction):  


Coded Allergies:  


     No Known Allergies (Verified , 17)


Home Meds


Active Scripts


Promethazine Supp (Phenergan Supp) 25 Mg Supp, 25 MG RECTAL Q6H Y for NAUSEA OR 

VOMITING, #6 SUPP 0 Refills


   Prov:Bar Meyers II, MD         17


Promethazine (Phenergan) 25 Mg Tablet, 25 MG PO Q6H Y for NAUSEA OR VOMITING, #

30 TAB 0 Refills


   Prov:Bar Meyers II, MD         17


Cephalexin (Keflex) 500 Mg Cap, 500 MG PO Q12H for Infection for 7 Days, CAP 0 

Refills


   Prov:Oliva Armas MD         17


Doxylamine-Pyridoxine (Diclegis) 10-10 Mg Tab, 1 TAB PO AS DIRECTED for 30 Days


   2 tab po hs day 1. If symptoms persist, 1 tab in am + 2 tab hs day 2.


   If symptoms persist, 1 tab in am + 1 tab noon + 2 tab hs day 3.


   Prov:Shiloh Marques MD         6/10/17


Reported Medications


Docosahexaenoic Acid (Prenatal Dha) 200 Mg Cap


   8/2/17





Review of Systems


General / Constitutional:  No: Fever, Chills


Eyes:  No: Diploplia, Blurred Vision, Visual changes, Pain, Photophobia, Other


HENT:  No: Headaches, Vertigo, Dental Difficulties, Lightheadedness, Other


Cardiovascular:  No: Irregular Rhythm, Chest Pain or Discomfort, Palpitations, 

Tachycardia, Syncope, Varicosities, Edema, Cyanosis, Other


Respiratory:  No: Cough, Short of Breath, Wheezing, Other


Gastrointestinal:  Diarrhea, Abdominal Pain


Genitourinary:  No: Urgency, Frequency, Dysuria, Nocturia, Hematuria, Decreased 

Urinary Output, Oliguria, Hesitancy, Dribbling, Incontinence, Pelvic Pain, 

Dyspareunia, Discharge, Menorrhagia, Vaginal Bleeding, Other


Musculoskeletal:  No: Limited ROM, Weakness, Cramping, Edema, Pain, Other


Skin:  No Rash, No Itching, No Dryness, No Lumps, No Change in Pigmentation, No 

Change in Nails, No Alopecia, No Lesions, No Breast Lumps, No Breast Tenderness

, No Breast Swelling, No Other


Neurologic:  No: Weakness, Dizziness, Syncope, Focal Abnormalities, 

Coordination Problem, Headache, Slurred Speech, Seizures, Other


Psychiatric:  No: Anxiety, Depression, Suicidal Ideations, Disorder of Thought, 

Mood Disorder, Substance Abuse, Homicidal Ideation, Other


Endocrine:  No: Heat Intolerance, Cold Intolerance, Polydipsia, Polyuria, Other


Hematologic/Lymphatic:  No Easy Bruising, No Lymph Node Enlargement, No Other





Physical Exam


118/57, 78, 18, 98.6


Narrative


GENERAL: Well-nourished, well-developed patient.


SKIN: Warm and dry.


HEAD: Normocephalic and atraumatic.


EYES: No scleral icterus. No injection or drainage. 


ENT: No nasal drainage noted. Mucous membranes pink. Airway patent.


NECK: Supple, trachea midline. No JVD.


CARDIOVASCULAR: Regular rate and rhythm without murmurs, gallops, or rubs. 


RESPIRATORY: Breath sounds equal bilaterally. No accessory muscle use.





ABDOMEN/GI: Abdomen soft, non-tender, bowel sounds present, no rebound, no 

guarding 


   Gravid 


GENITOURINARY: 


   External Genitalia: intact and normal in appearance


   BUS glands: [wnl]


   Cervix: post


   Dilatation: closed          


   Effacement: thick          


   Station: high  


   Presentation: -        


   Membranes: intact


   Uterine Contractions: none


FHT's: 


   Category: -   


   Baseline: 120   


   Reactive: no, reassuring for gest age   


   Variability: mod  


   Decels: -  


EXTREMITIES: No cyanosis or edema.


BACK: Nontender without obvious deformity. No CVA tenderness.


NEUROLOGICAL: Awake and alert. Motor and sensory grossly within normal limits. 

Five out of 5 muscle strength in all muscle groups. Normal speech.





Data


Data


Vital Signs Reviewed:  Yes


Orders





 Orders


Vital Signs (Adult) .ON ADMISSION (10/19/17 21:42)


^ Labor Status (10/19/17 21:42)


^ Hydration (10/19/17 21:42)


Loperamide (Imodium) (10/19/17 21:45)


Labs


urine dip:  trace protein, otherwise neg





MDM


Medical Record Reviewed:  Yes


Narrative Course / MDM


31 y/o  with IUP at 28.6 wks with diarrhea and cramping


--suspect gastroenteritis (versus food intolerance/food poisoning)


--pt able to tolerate PO


--immodium x 1 now


--advised rest, hydration


--return if symptoms worsen/persist


Plan


d/c home


Diagnosis


Diagnosis:  


 Primary Impression:  


 Gastroenteritis


 Additional Impression:  


 28 weeks gestation of pregnancy


Disposition:  01 DISCHARGE HOME


Condition:  Stable


Patient Instructions:  Abdominal Pain in Pregnancy (ED), Fetal Movement (ED)











Katelin Huang MD Oct 19, 2017 22:04

## 2017-10-19 NOTE — PD
HPI


Chief Complaint


cramping and diarrhea


Date Seen:  Oct 19, 2017


Time Seen:  21:45


Travel History


International Travel<30 Days:  No


Contact w/Intl Traveler<30Days:  No


Known Affected Area:  No





History of Present Illness


HPI


PT is a 33 y/o  with IUP at 28.6 wks who presents for evaluation of 

abdominal cramping and diarrhea.  PT states that after work, she had intense 

abdominal pain/cramping, followed by diarrhea.  She subsequently had another 

episode of diarrhea.  Pt denies n/v.  She is tolerating po liquids but reports 

decreased appetite.  denies fever, sick contacts. denies dysuria.  Pt states 

she ate at [employer] for lunch.


Pt denies contractions, vb, lof.  +FM


Weeks Gestation:  28


Para:  2


:  3





History


Past Medical History


Medical History:  Denies Significant Hx





Obstetric History


Obstetric History





  FTSVD


  FTSVD





Past Surgical History


*** Narrative Surgical


bartholin's cyst I





Family History


Family History:  Negative





Social History


Alcohol Use:  No


Tobacco Use:  No


Substance Abuse:  No





Allergies-Medications


(Allergen,Severity, Reaction):  


Coded Allergies:  


     No Known Allergies (Verified , 17)


Home Meds


Active Scripts


Promethazine Supp (Phenergan Supp) 25 Mg Supp, 25 MG RECTAL Q6H Y for NAUSEA OR 

VOMITING, #6 SUPP 0 Refills


   Prov:Bar Meyers II, MD         17


Promethazine (Phenergan) 25 Mg Tablet, 25 MG PO Q6H Y for NAUSEA OR VOMITING, #

30 TAB 0 Refills


   Prov:Bar Meyers II, MD         17


Cephalexin (Keflex) 500 Mg Cap, 500 MG PO Q12H for Infection for 7 Days, CAP 0 

Refills


   Prov:Oliva Armas MD         17


Doxylamine-Pyridoxine (Diclegis) 10-10 Mg Tab, 1 TAB PO AS DIRECTED for 30 Days


   2 tab po hs day 1. If symptoms persist, 1 tab in am + 2 tab hs day 2.


   If symptoms persist, 1 tab in am + 1 tab noon + 2 tab hs day 3.


   Prov:Shiloh Marques MD         6/10/17


Reported Medications


Docosahexaenoic Acid (Prenatal Dha) 200 Mg Cap


   8/2/17





Review of Systems


General / Constitutional:  No: Fever, Chills


Eyes:  No: Diploplia, Blurred Vision, Visual changes, Pain, Photophobia, Other


HENT:  No: Headaches, Vertigo, Dental Difficulties, Lightheadedness, Other


Cardiovascular:  No: Irregular Rhythm, Chest Pain or Discomfort, Palpitations, 

Tachycardia, Syncope, Varicosities, Edema, Cyanosis, Other


Respiratory:  No: Cough, Short of Breath, Wheezing, Other


Gastrointestinal:  Diarrhea, Abdominal Pain


Genitourinary:  No: Urgency, Frequency, Dysuria, Nocturia, Hematuria, Decreased 

Urinary Output, Oliguria, Hesitancy, Dribbling, Incontinence, Pelvic Pain, 

Dyspareunia, Discharge, Menorrhagia, Vaginal Bleeding, Other


Musculoskeletal:  No: Limited ROM, Weakness, Cramping, Edema, Pain, Other


Skin:  No Rash, No Itching, No Dryness, No Lumps, No Change in Pigmentation, No 

Change in Nails, No Alopecia, No Lesions, No Breast Lumps, No Breast Tenderness

, No Breast Swelling, No Other


Neurologic:  No: Weakness, Dizziness, Syncope, Focal Abnormalities, 

Coordination Problem, Headache, Slurred Speech, Seizures, Other


Psychiatric:  No: Anxiety, Depression, Suicidal Ideations, Disorder of Thought, 

Mood Disorder, Substance Abuse, Homicidal Ideation, Other


Endocrine:  No: Heat Intolerance, Cold Intolerance, Polydipsia, Polyuria, Other


Hematologic/Lymphatic:  No Easy Bruising, No Lymph Node Enlargement, No Other





Physical Exam


118/57, 78, 18, 98.6


Narrative


GENERAL: Well-nourished, well-developed patient.


SKIN: Warm and dry.


HEAD: Normocephalic and atraumatic.


EYES: No scleral icterus. No injection or drainage. 


ENT: No nasal drainage noted. Mucous membranes pink. Airway patent.


NECK: Supple, trachea midline. No JVD.


CARDIOVASCULAR: Regular rate and rhythm without murmurs, gallops, or rubs. 


RESPIRATORY: Breath sounds equal bilaterally. No accessory muscle use.





ABDOMEN/GI: Abdomen soft, non-tender, bowel sounds present, no rebound, no 

guarding 


   Gravid 


GENITOURINARY: 


   External Genitalia: intact and normal in appearance


   BUS glands: [wnl]


   Cervix: post


   Dilatation: closed          


   Effacement: thick          


   Station: high  


   Presentation: -        


   Membranes: intact


   Uterine Contractions: none


FHT's: 


   Category: -   


   Baseline: 120   


   Reactive: no, reassuring for gest age   


   Variability: mod  


   Decels: -  


EXTREMITIES: No cyanosis or edema.


BACK: Nontender without obvious deformity. No CVA tenderness.


NEUROLOGICAL: Awake and alert. Motor and sensory grossly within normal limits. 

Five out of 5 muscle strength in all muscle groups. Normal speech.





Data


Data


Vital Signs Reviewed:  Yes


Orders





 Orders


Vital Signs (Adult) .ON ADMISSION (10/19/17 21:42)


^ Labor Status (10/19/17 21:42)


^ Hydration (10/19/17 21:42)


Loperamide (Imodium) (10/19/17 21:45)


Labs


urine dip:  trace protein, otherwise neg





MDM


Medical Record Reviewed:  Yes


Narrative Course / MDM


33 y/o  with IUP at 28.6 wks with diarrhea and cramping


--suspect gastroenteritis (versus food intolerance/food poisoning)


--pt able to tolerate PO


--immodium x 1 now


--advised rest, hydration


--return if symptoms worsen/persist


Plan


d/c home


Diagnosis


Diagnosis:  


 Primary Impression:  


 Gastroenteritis


 Additional Impression:  


 28 weeks gestation of pregnancy


Disposition:  01 DISCHARGE HOME


Condition:  Stable


Patient Instructions:  Abdominal Pain in Pregnancy (ED), Fetal Movement (ED)











Katelin Huang MD Oct 19, 2017 22:04

## 2017-11-04 ENCOUNTER — HOSPITAL ENCOUNTER (EMERGENCY)
Dept: HOSPITAL 17 - NEPK | Age: 32
Discharge: HOME | End: 2017-11-04
Payer: MEDICAID

## 2017-11-04 VITALS
DIASTOLIC BLOOD PRESSURE: 64 MMHG | HEART RATE: 81 BPM | TEMPERATURE: 99 F | OXYGEN SATURATION: 100 % | RESPIRATION RATE: 14 BRPM | SYSTOLIC BLOOD PRESSURE: 109 MMHG

## 2017-11-04 DIAGNOSIS — O98.513: Primary | ICD-10-CM

## 2017-11-04 DIAGNOSIS — Z3A.31: ICD-10-CM

## 2017-11-04 DIAGNOSIS — J02.9: ICD-10-CM

## 2017-11-04 PROCEDURE — 99283 EMERGENCY DEPT VISIT LOW MDM: CPT

## 2017-11-04 PROCEDURE — 87880 STREP A ASSAY W/OPTIC: CPT

## 2017-11-04 PROCEDURE — 87081 CULTURE SCREEN ONLY: CPT

## 2017-11-04 PROCEDURE — 87804 INFLUENZA ASSAY W/OPTIC: CPT

## 2017-11-04 NOTE — PD
HPI


Chief Complaint:  Cold / Flu Symptoms


Time Seen by Provider:  12:37


Travel History


International Travel<30 days:  No


Contact w/Intl Traveler<30days:  No


Traveled to known affect area:  No





History of Present Illness


HPI


32-year-old female, approximately 31 weeks pregnant, presents to emergency 

Department with complaint of cough, nasal congestion, sore throat 3 days.  

Reports body aches.  Denies fevers, ear pain, shortness of breath, wheezing, 

chest pain.  Denies abdominal pain, abdominal cramping, vaginal discharge, 

vaginal leakage, vaginal bleeding.  Denies vomiting.  Fetal movement is normal.

  Has been taking Tylenol for symptom management.  Symptoms are mild in 

severity.  No known allergies.  Has no medical complaints.  No other modifying 

factors or associated signs and symptoms.





PFSH


Past Medical History


Hx Anticoagulant Therapy:  No


Asthma:  Yes (CHILDHOOD)


Cardiovascular Problems:  No


Chemotherapy:  No


Cerebrovascular Accident:  No


Diabetes:  No


Diminished Hearing:  No


Respiratory:  No


Immunizations Current:  No


Pregnant?:  Pregnant


:  3


Para:  2


Miscarriage:  0


:  0


Ectopic Pregnancy:  No


Tubal Ligation:  No





Past Surgical History


Genitourinary Surgery:  Yes (BARTHOLIN CYST; SEEMS NOT TO HAVE BEEN REMOVED)


Hysterectomy:  No





Social History


Alcohol Use:  No


Tobacco Use:  No


Substance Use:  No





Allergies-Medications


(Allergen,Severity, Reaction):  


Coded Allergies:  


     No Known Allergies (Verified  Adverse Reaction, Unknown, 17)


Reported Meds & Prescriptions





Reported Meds & Active Scripts


Active


Phenergan Supp (Promethazine HCl) 25 Mg Supp 25 Mg RECTAL Q6H PRN


Phenergan (Promethazine HCl) 25 Mg Tablet 25 Mg PO Q6H PRN


Keflex (Cephalexin) 500 Mg Cap 500 Mg PO Q12H 7 Days


Diclegis (Doxylamine-Pyridoxine) 10-10 Mg Tab 1 Tab PO AS DIRECTED 30 Days


     2 tab po hs day 1. If symptoms persist, 1 tab in am + 2 tab hs day 2.


     If symptoms persist, 1 tab in am + 1 tab noon + 2 tab hs day 3.


Reported


Prenatal Dha (Docosahexaenoic Acid) 200 Mg Cap   








Review of Systems


Except as stated in HPI:  all other systems reviewed are Neg





Physical Exam


Narrative


GENERAL: Well-nourished, well-developed black female patient, in no acute 

distress; afebrile, nontoxic-appearing


SKIN: Warm and dry.  No rash.


HEAD: Atraumatic. Normocephalic. 


EYES: Pupils equal and round. No scleral icterus. No injection or drainage. 


ENT: Mucosa pink and moist. No erythema or exudates. No uvular edema. No uvular

, palatal, or tonsillar deviation.  Airway patent.  


EARS: Bilateral pinnae and external canals appear within normal limits.  

Bilateral tympanic membranes without  erythema, dullness or perforation. 


NECK: Trachea midline.  No lymphadenopathy.  


CARDIOVASCULAR: Regular rate and rhythm.  No murmur appreciated.  


RESPIRATORY: No accessory muscle use. Clear to auscultation. Breath sounds 

equal bilaterally.  No retractions or tachypnea.


GASTROINTESTINAL: Pregnant.  


MUSCULOSKELETAL: No obvious deformities. No clubbing.  No cyanosis.  No edema. 


NEUROLOGICAL: Awake and alert.  Oriented 3.  No obvious cranial nerve 

deficits.  Motor grossly within normal limits. Normal speech.  Moves all 

extremities.  5/5 strength to all extremities.


PSYCHIATRIC: Appropriate mood and affect; insight and judgment normal.





Data


Data


Last Documented VS





Vital Signs








  Date Time  Temp Pulse Resp B/P (MAP) Pulse Ox O2 Delivery O2 Flow Rate FiO2


 


17 12:04 99.0 81 14 109/64 (79) 100   








Orders





 Orders


Group A Rapid Strep Screen (17 12:26)


Influenzae A/B Antigen (17 12:26)


Strep Culture (Group A) (17 12:30)


Ed Discharge Order (17 12:59)








MDM


Medical Decision Making


Medical Screen Exam Complete:  Yes


Emergency Medical Condition:  Yes


Medical Record Reviewed:  Yes


Differential Diagnosis


Influenza, strep pharyngitis, viral illness


Narrative Course


32-year-old female with cold/cough/flu symptoms 3 days.  Patient is 31 weeks 

pregnant.  Denies abdominal pain, cramping; vaginal discharge, bleeding, 

leakage.  Patient is afebrile and nontoxic-appearing.  Denies fever, vomiting.  

Influenza and rapid strep ordered.


1258:  Influenza and rapid strep negative.  Suspecting viral illness.  

Discussed viral illness and symptom management.  Instructed patient to follow 

up with obstetrician.  Instructed patient to follow up with primary care 

provider.  Patient verbalizes understanding and agreement with treatment plan.  

Patient is medically cleared and stable for discharge.  Discussed reasons to 

return to the emergency department.  Patient agrees with treatment plan.  The 

patients vital signs are stable and the patient is stable for outpatient follow-

up and treatment.  Patient discharged home, stable and in no acute distress.





Diagnosis





 Primary Impression:  


 Viral illness


Referrals:  


Obstetrician





Primary Care Physician


Patient Instructions:  Cold Symptoms (ED), General Instructions


Departure Forms:  Tests/Procedures, Work Release   Enter return to work date:  

2017





***Additional Instructions:  


Tylenol as instructed and as needed for fever/pain


Over-the-counter cough and cold medications as directed and as needed for 

symptom management


Get plenty of sleep/rest


Drink plenty of fluids to prevent dehydration; popsicles and Gatorade


Use an air humidifier/turn off ceiling fans


Follow-up with primary care provider


Return immediately to the emergency department with worsening of symptoms


***Med/Other Pt SpecificInfo:  No Change to Meds, No Meds Exist/No RX given


Disposition:  01 DISCHARGE HOME


Condition:  Stable











Trinh Rosales 2017 12:58

## 2017-11-17 ENCOUNTER — HOSPITAL ENCOUNTER (EMERGENCY)
Dept: HOSPITAL 17 - HOBED | Age: 32
Discharge: HOME | End: 2017-11-17
Payer: MEDICAID

## 2017-11-17 DIAGNOSIS — R05: ICD-10-CM

## 2017-11-17 DIAGNOSIS — O36.8130: Primary | ICD-10-CM

## 2017-11-17 DIAGNOSIS — Z3A.33: ICD-10-CM

## 2017-11-17 PROCEDURE — 59025 FETAL NON-STRESS TEST: CPT

## 2017-11-17 PROCEDURE — 76819 FETAL BIOPHYS PROFIL W/O NST: CPT

## 2017-11-17 PROCEDURE — 76816 OB US FOLLOW-UP PER FETUS: CPT

## 2017-11-17 NOTE — PD
HPI


Chief Complaint


Decreased fetal movement, cough


Date Seen:  2017


Time Seen:  11:45


Travel History


International Travel<30 Days:  No


Contact w/Intl Traveler<30Days:  No


Known Affected Area:  No





History of Present Illness


HPI


Patient is a 32-year-old  at 33 weeks and 0 days who presents with 

decreased fetal movement. Patient is a patient of Dr. Graf, who recommended 

that the patient presented here to be evaluated. She reports that she has been 

sick with a cough for 3 weeks. She had a chest x-ray yesterday that was 

negative for pneumonia. She reports that she has not felt any fetal movement 

since yesterday around 11 PM. She does report a little mucus on the toilet 

paper with wiping, but she denies any vaginal bleeding, leakage of fluid, 

contractions.





History


Past Medical History


Medical History:  Denies Significant Hx





Obstetric History


Obstetric History


Patient is a . Her last 2 pregnancies were uncomplicated, full-term, 

normal birthweight, vaginal deliveries. She has had a lot of nausea and 

vomiting with this pregnancy.





Past Surgical History


*** Narrative Surgical


Bartholin's cyst removal





Family History


*** Narrative Family History


Her son has asthma and allergies.





Social History


*** Narrative Social History


Patient lives at home with her 2 children.


Alcohol Use:  No


Tobacco Use:  No


Substance Abuse:  No





Allergies-Medications


(Allergen,Severity, Reaction):  


Coded Allergies:  


     No Known Allergies (Verified  Adverse Reaction, Unknown, 17)


Home Meds


Active Scripts


Promethazine Supp (Phenergan Supp) 25 Mg Supp, 25 MG RECTAL Q6H Y for NAUSEA OR 

VOMITING, #6 SUPP 0 Refills


   Prov:Bar Meyers II, MD         17


Promethazine (Phenergan) 25 Mg Tablet, 25 MG PO Q6H Y for NAUSEA OR VOMITING, #

30 TAB 0 Refills


   Prov:Bar Meyers II, MD         17


Cephalexin (Keflex) 500 Mg Cap, 500 MG PO Q12H for Infection for 7 Days, CAP 0 

Refills


   Prov:Oliva Armas MD         17


Doxylamine-Pyridoxine (Diclegis) 10-10 Mg Tab, 1 TAB PO AS DIRECTED for 30 Days


   2 tab po hs day 1. If symptoms persist, 1 tab in am + 2 tab hs day 2.


   If symptoms persist, 1 tab in am + 1 tab noon + 2 tab hs day 3.


   Prov:Shiloh Marques MD         6/10/17


Reported Medications


Docosahexaenoic Acid (Prenatal Dha) 200 Mg Cap


   17





Review of Systems


General / Constitutional:  No: Fever, Chills


Eyes:  No: Visual changes


HENT:  No: Headaches


Cardiovascular:  No: Chest Pain or Discomfort, Edema


Respiratory:  Cough, No: Short of Breath


Gastrointestinal:  No: Nausea, Vomiting, Abdominal Pain


Genitourinary:  No: Dysuria





Physical Exam


107/63, 95, 20, 98.2


Narrative


GENERAL: Well-nourished, well-developed patient.


SKIN: Warm and dry.


HEAD: Normocephalic and atraumatic.


EYES: No scleral icterus. No injection or drainage. 


ENT: No nasal drainage noted. Mucous membranes pink. Airway patent.


NECK: Supple, trachea midline. No JVD.


CARDIOVASCULAR: Regular rate and rhythm without murmurs, gallops, or rubs. 


RESPIRATORY: Breath sounds equal bilaterally. No accessory muscle use.


ABDOMEN/GI: Abdomen soft, non-tender, bowel sounds present, no rebound, no 

guarding 


   Gravid to 33 weeks size


GENITOURINARY: 


   Uterine Contractions: none


FHT's: 


   Category: Category 1


   Baseline: 130   


   Reactive: No 15 x 15 accelerations, but there were 10 x 10 accelerations 


   Variability: Moderate


   Decels: None


EXTREMITIES: No cyanosis or edema.


BACK: Nontender without obvious deformity. No CVA tenderness.


NEUROLOGICAL: Awake and alert. Motor and sensory grossly within normal limits. 

Five out of 5 muscle strength in all muscle groups. Normal speech.





Data


Data


Vital Signs Reviewed:  Yes


Orders





 Orders


Vital Signs (Adult) .ON ADMISSION (17 11:52)


^ Labor Status (17 11:52)


^ Non Stress Test (17 11:52)


^ Hydration (17 11:52)


Us Ob Bpp Wo Nst (17 11:52)





MDM


Plan


Patient is a 32-year-old  at 33 weeks and 0 days who presents with 

decreased fetal movement. Patient is a patient of Dr. Graf. FHT is category 

1 but No 15 x 15 accelerations, but there were 10 x 10 accelerations. 





1. Decreased fetal movement


-Monitor vitals


-Monitor labor status/tocometry


-Encourage by mouth hydration


-BPP 8 out of 8. Biometry is consistent with known EDC provided by patient. No 

anomalies seen, although somewhat limited due to maternal body habitus, fetal 

lie and late gestational age. Normal amniotic fluid. NST being performed in the 

OB ED now looks more reactive.





2. cough - likely bronchitis with negative chest x-ray yesterday


-Supportive care, encourage by mouth hydration


-Honey and over-the-counter cough medications as needed





d/w Dr. Meyers.


Diagnosis


Diagnosis:  


 Primary Impression:  


 Decreased fetal movement


 Additional Impression:  


 Cough


 Ruled Out:  Fetal distress affecting delivery


Disposition:  01 DISCHARGE HOME


Condition:  Good Landau,Michael A MD R2 2017 12:03

## 2017-11-22 ENCOUNTER — HOSPITAL ENCOUNTER (OUTPATIENT)
Dept: HOSPITAL 17 - HPND | Age: 32
End: 2017-11-22
Attending: OBSTETRICS & GYNECOLOGY
Payer: MEDICAID

## 2017-11-22 DIAGNOSIS — O35.8XX0: Primary | ICD-10-CM

## 2017-11-22 PROCEDURE — 93325 DOPPLER ECHO COLOR FLOW MAPG: CPT

## 2017-11-22 PROCEDURE — 76825 ECHO EXAM OF FETAL HEART: CPT

## 2017-11-22 PROCEDURE — 76827 ECHO EXAM OF FETAL HEART: CPT

## 2017-11-28 ENCOUNTER — HOSPITAL ENCOUNTER (EMERGENCY)
Dept: HOSPITAL 17 - NEPD | Age: 32
Discharge: HOME | End: 2017-11-28
Payer: MEDICAID

## 2017-11-28 VITALS
OXYGEN SATURATION: 98 % | RESPIRATION RATE: 17 BRPM | SYSTOLIC BLOOD PRESSURE: 120 MMHG | HEART RATE: 84 BPM | TEMPERATURE: 98.7 F | DIASTOLIC BLOOD PRESSURE: 56 MMHG

## 2017-11-28 VITALS
OXYGEN SATURATION: 98 % | DIASTOLIC BLOOD PRESSURE: 68 MMHG | RESPIRATION RATE: 18 BRPM | HEART RATE: 82 BPM | SYSTOLIC BLOOD PRESSURE: 112 MMHG | TEMPERATURE: 98.4 F

## 2017-11-28 VITALS — WEIGHT: 224.87 LBS | HEIGHT: 60 IN | BODY MASS INDEX: 44.15 KG/M2

## 2017-11-28 VITALS — OXYGEN SATURATION: 98 %

## 2017-11-28 DIAGNOSIS — R05: Primary | ICD-10-CM

## 2017-11-28 PROCEDURE — 94664 DEMO&/EVAL PT USE INHALER: CPT

## 2017-11-28 PROCEDURE — 99283 EMERGENCY DEPT VISIT LOW MDM: CPT

## 2017-11-28 RX ADMIN — ALBUTEROL SULFATE ONE MG: 2.5 SOLUTION RESPIRATORY (INHALATION) at 08:30

## 2017-11-28 RX ADMIN — ALBUTEROL SULFATE ONE MG: 2.5 SOLUTION RESPIRATORY (INHALATION) at 08:52

## 2017-11-28 NOTE — PD
HPI


Chief Complaint:  Cold / Flu Symptoms


Time Seen by Provider:  08:20


Travel History


International Travel<30 days:  No


Contact w/Intl Traveler<30days:  No


Traveled to known affect area:  No





History of Present Illness


HPI


32-year-old female presents to the emergency department with a cough for 4 

weeks.  States that last night she became congested last night and decided to 

come to the emergency department.  States that she went to [facility] 

[facility] and received chest x-ray x-ray approximate 2 weeks ago which was normal, 

per patient.  Patient states that her cough is nonproductive and she has been 

using multiple over-the-counter medications such as Robitussin, Mucinex, 

Zyrtec.  She is also received Tessalon Perles from her primary care.  Patient 

has consulted with her obstetrician for her nausea during pregnancy and has 

been given 2 medications without relief. She denies fever, chills, chest pain, 

shortness of breath, abdominal pain.  Patient also denies diarrhea.





PFSH


Past Medical History


Hx Anticoagulant Therapy:  No


Asthma:  Yes (CHILDHOOD)


Cardiovascular Problems:  No


Chemotherapy:  No


Cerebrovascular Accident:  No


Diabetes:  No


Diminished Hearing:  No


Respiratory:  No


Immunizations Current:  No


Pregnant?:  Pregnant


LMP:  34 WEEKS PREGNANT


:  3


Para:  2


Miscarriage:  0


:  0


Ectopic Pregnancy:  No


Tubal Ligation:  No





Past Surgical History


Genitourinary Surgery:  Yes (BARTHOLIN CYST; SEEMS NOT TO HAVE BEEN REMOVED)


Hysterectomy:  No





Social History


Alcohol Use:  No


Tobacco Use:  No


Substance Use:  No





Allergies-Medications


(Allergen,Severity, Reaction):  


Coded Allergies:  


     No Known Allergies (Verified  Adverse Reaction, Unknown, 17)


Reported Meds & Prescriptions





Reported Meds & Active Scripts


Active


Famotidine 20 Mg Tab 20 Mg PO BID








Review of Systems


Except as stated in HPI:  all other systems reviewed are Neg





Physical Exam


Narrative


GENERAL: Well-nourished, well-developed patient.


SKIN: Focused skin assessment warm/dry.


HEAD: Normocephalic.


EYES: No scleral icterus. No injection or drainage. 


NECK: Supple, trachea midline. No JVD or lymphadenopathy.


CARDIOVASCULAR: Regular rate and rhythm without murmurs, gallops, or rubs. 


RESPIRATORY: Breath sounds equal bilaterally. No accessory muscle use.  No 

wheezes, rales or rhonchi.


GASTROINTESTINAL: Abdomen soft, non-tender.


MUSCULOSKELETAL: No cyanosis, or edema.  Homans sign negative bilaterally


BACK: Nontender without obvious deformity. No CVA tenderness.





Data


Data


Last Documented VS





Vital Signs








  Date Time  Temp Pulse Resp B/P (MAP) Pulse Ox O2 Delivery O2 Flow Rate FiO2


 


17 09:55        


 


17 09:03     98   21


 


17 08:09 98.4 82 18   Room Air  








Orders





 Orders


Albuterol Neb (Albuterol Neb) (17 08:30)


Famotidine (Pepcid) (17 08:45)


Ed Discharge Order (17 09:30)








[facility]


Medical Decision Making


Medical Screen Exam Complete:  Yes


Emergency Medical Condition:  Yes


Differential Diagnosis


Esophagitis versus unexplained cough versus bronchospasm versus reflux versus 

viral syndrome


Narrative Course


32-year-old female presents to the emergency department with a cough for 4 

weeks.  States that last night she became congested last night and decided to 

come to the emergency department.  States that she went to [facility] 

[facility] and received chest x-ray x-ray approximate 2 weeks ago which was normal, 

per patient.  Patient states that her cough is nonproductive and she has been 

using multiple over-the-counter medications such as Robitussin, Mucinex, 

Zyrtec.  She is also received Tessalon Perles from her primary care.  Patient 

has consulted with her obstetrician for her nausea during pregnancy and has 

been given 2 medications without relief. She denies fever, chills, chest pain, 

shortness of breath, abdominal pain.  Patient also denies diarrhea.  Denies leg 

pain.


Vital signs stable


Physical exam findings- erythematous pharynx without tonsillar exudate or 

hypertrophy.  No Cervical adenopathy.  Homans sign negative bilaterally


Patient has used multiple over-the-counter medications and admits to nonbilious

, nonbloody vomiting twice a day during the pregnancy.  She is also gone to 

[facility] and had a chest x-ray which was "normal".  Because the 

patient multiple regimens patient most likely has reflux, bronchospasm, or 

irritation of the pharynx secondary to the vomiting.  Trial of albuterol 

treatments today to determine if this reduces her cough.  


Patient continues to cough after the albuterol treatment.  We'll avoid out 

patient treatment with albuterol for now


Patient will be discharged with ranitidine and follow up with primary care 

physician for potential outpatient follow-up with ear nose and throat 

specialist.


Patient understood my discharge instructions and will comply.





Diagnosis





 Primary Impression:  


 Cough


Referrals:  


Ear / Nose / Throat Specialist





Primary Care Physician





***Additional Instructions:  


Follow-up the primary care physician within 2-3 days.


Take ranitidine daily for at least 10 days or until your primary care or 

obstetrician advised to stop.


He may continue to take Zyrtec for your nasal congestion.


Scripts


Famotidine (Famotidine) 20 Mg Tab


20 MG PO BID, #30 TAB 0 Refills


   Prov: Bridger Clark MD         17


Disposition:  01 DISCHARGE HOME


Condition:  Stable











Anyi Reynolds 2017 08:28

## 2017-12-14 ENCOUNTER — HOSPITAL ENCOUNTER (EMERGENCY)
Dept: HOSPITAL 17 - HOBED | Age: 32
Discharge: HOME | End: 2017-12-14
Payer: MEDICAID

## 2017-12-14 DIAGNOSIS — Z3A.37: ICD-10-CM

## 2017-12-14 DIAGNOSIS — O36.8130: Primary | ICD-10-CM

## 2017-12-14 DIAGNOSIS — O26.893: ICD-10-CM

## 2017-12-14 DIAGNOSIS — R10.9: ICD-10-CM

## 2017-12-14 PROCEDURE — 59025 FETAL NON-STRESS TEST: CPT

## 2017-12-14 NOTE — PD
HPI


Chief Complaint


Decreased fetal movement and sharp abdominal pains


Date Seen:  Dec 14, 2017


Time Seen:  13:30


Travel History


International Travel<30 Days:  No


Contact w/Intl Traveler<30Days:  No


Known Affected Area:  No





History of Present Illness


HPI


Patient is 32-year-old black female  36-37 weeks sees Dr. Graf for 

prenatal care presents complaining of decreased fetal movement and sharp 

abdominal pains today.  Denies bleeding or leakage of fluid.  Heart rate 

tracing is reactive with good accelerations.  Variability.  And says she's been 

has patient has started feeling movement of here on OB ED.  No regular 

contractions.


Weeks Gestation:  36


Para:  2


:  3





History


Obstetric History


Obstetric History


2 vaginal deliveries





Family History


Family History:  





Social History


Alcohol Use:  No


Tobacco Use:  No


Substance Abuse:  No





Allergies-Medications


(Allergen,Severity, Reaction):  


Coded Allergies:  


     No Known Allergies (Verified  Adverse Reaction, Unknown, 17)


Home Meds


Active Scripts


Famotidine (Famotidine) 20 Mg Tab, 20 MG PO BID, #30 TAB 0 Refills


   Prov:Bridger Clark MD         17





Review of Systems


General / Constitutional:  No: Fever, Weight Gain, Chills, Other


Eyes:  No: Diploplia, Blurred Vision, Visual changes, Pain, Photophobia


HENT:  No: Headaches, Vertigo, Lightheadedness


Cardiovascular:  No: Irregular Rhythm, Chest Pain or Discomfort, Palpitations, 

Tachycardia, Syncope, Varicosities, Edema, Cyanosis


Respiratory:  No: Cough, Short of Breath, Other


Gastrointestinal:  Abdominal Pain, No: Nausea, Vomiting, Diarrhea


Genitourinary:  No: Decreased Urinary Output, Oliguria


Musculoskeletal:  No: Limited ROM, Weakness, Cramping, Edema, Pain


Skin:  No Rash, No Itching, No Dryness, No Lumps, No Change in Pigmentation, No 

Change in Nails, No Alopecia, No Lesions


Neurologic:  No: Weakness, Dizziness, Syncope, Focal Abnormalities, 

Coordination Problem, Headache, Slurred Speech, Seizures


Psychiatric:  No: Depression, Suicidal Ideations, Homicidal Ideation


Endocrine:  No: Heat Intolerance, Cold Intolerance, Polydipsia, Polyuria, Other





Physical Exam


Narrative


GENERAL: Well-nourished, well-developed patient.


SKIN: Warm and dry.


HEAD: Normocephalic and atraumatic.


EYES: No scleral icterus. No injection or drainage. 


ENT: No nasal drainage noted. Mucous membranes pink. Airway patent.


NECK: Supple, trachea midline. No JVD.


CARDIOVASCULAR: Regular rate and rhythm without murmurs, gallops, or rubs. 


RESPIRATORY: Breath sounds equal bilaterally. No accessory muscle use.


BREASTS: Bilateral exam showed no masses , no retractions, no nipple discharge.


ABDOMEN/GI: Abdomen soft, non-tender, bowel sounds present, no rebound, no 

guarding 


   Gravid to [36-] weeks size


   Fundal Height: [-36]


GENITOURINARY: 


   External Genitalia: intact and normal in appearance


   BUS glands: [-]


   Cervix: [-Posterior]


   Dilatation: [0-]          


   Effacement: [-0]          


   Station: [-3]  


   Presentation: [vtx-]        


   Membranes: [intact ]


   Uterine Contractions: [-none]


FHT's: 


   Category: [1-]   


   Baseline: [133-]   


   Reactive: [-yes]   


   Variability: [mod-]  


   Decels: [0-]  


EXTREMITIES: No cyanosis or edema.


BACK: Nontender without obvious deformity. No CVA tenderness.


NEUROLOGICAL: Awake and alert. Motor and sensory grossly within normal limits. 

Five out of 5 muscle strength in all muscle groups. Normal speech.





MDM


Interpretation(s)


Patient is 32-year-old black female  36-37 weeks sees Dr. Graf in 

appearance presented here for decreased fetal movement, fetal heart rate 

tracing is reactive and the baby is active here on OB ED.  No contractions 

noted.  She describes some abdominal pain that began earlier today but are not 

contractions and soft tissue strain and pain.  Cervix is closed high


Plan


Plan the patient to be at bedrest at home as much as possible increase her 

fluid intake for hydration, Tylenol when necessary for pain.  Heating pad or 

hot bath perceptibly.  She to follow-up with Dr. Graf


Diagnosis


Diagnosis:  


 Primary Impression:  


 Decreased fetal movement affecting management of pregnancy in third trimester


 Additional Impression:  


 Abdominal pain during pregnancy in third trimester


Disposition:  01 DISCHARGE HOME


Condition:  Stable











Bar Meyers II, MD Dec 14, 2017 13:36

## 2017-12-24 ENCOUNTER — HOSPITAL ENCOUNTER (EMERGENCY)
Dept: HOSPITAL 17 - HOBED | Age: 32
Discharge: HOME | End: 2017-12-24
Payer: MEDICAID

## 2017-12-24 VITALS — HEIGHT: 60 IN | WEIGHT: 288 LBS | BODY MASS INDEX: 56.54 KG/M2

## 2017-12-24 DIAGNOSIS — Z3A.38: ICD-10-CM

## 2017-12-24 DIAGNOSIS — O99.283: ICD-10-CM

## 2017-12-24 DIAGNOSIS — O47.1: Primary | ICD-10-CM

## 2017-12-24 DIAGNOSIS — R51: ICD-10-CM

## 2017-12-24 DIAGNOSIS — E87.6: ICD-10-CM

## 2017-12-24 LAB
ALBUMIN SERPL-MCNC: 2.8 GM/DL (ref 3.4–5)
ALP SERPL-CCNC: 117 U/L (ref 45–117)
ALT SERPL-CCNC: 13 U/L (ref 10–53)
AST SERPL-CCNC: 14 U/L (ref 15–37)
BILIRUB SERPL-MCNC: 0.2 MG/DL (ref 0.2–1)
BUN SERPL-MCNC: 7 MG/DL (ref 7–18)
CALCIUM SERPL-MCNC: 8.8 MG/DL (ref 8.5–10.1)
CHLORIDE SERPL-SCNC: 106 MEQ/L (ref 98–107)
COLOR UR: YELLOW
CREAT SERPL-MCNC: 0.66 MG/DL (ref 0.5–1)
ERYTHROCYTE [DISTWIDTH] IN BLOOD BY AUTOMATED COUNT: 14.2 % (ref 11.6–17.2)
GFR SERPLBLD BASED ON 1.73 SQ M-ARVRAT: 125 ML/MIN (ref 89–?)
GLUCOSE SERPL-MCNC: 76 MG/DL (ref 74–106)
GLUCOSE UR STRIP-MCNC: (no result) MG/DL
HCO3 BLD-SCNC: 24.4 MEQ/L (ref 21–32)
HCT VFR BLD CALC: 34.8 % (ref 35–46)
HGB BLD-MCNC: 11.4 GM/DL (ref 11.6–15.3)
HGB UR QL STRIP: (no result)
KETONES UR STRIP-MCNC: (no result) MG/DL
MCH RBC QN AUTO: 29.2 PG (ref 27–34)
MCHC RBC AUTO-ENTMCNC: 32.8 % (ref 32–36)
MCV RBC AUTO: 89.1 FL (ref 80–100)
MUCOUS THREADS #/AREA URNS LPF: (no result) /LPF
NITRITE UR QL STRIP: (no result)
PLATELET # BLD: 323 TH/MM3 (ref 150–450)
PMV BLD AUTO: 8.3 FL (ref 7–11)
PROT SERPL-MCNC: 7.5 GM/DL (ref 6.4–8.2)
RBC # BLD AUTO: 3.91 MIL/MM3 (ref 4–5.3)
SODIUM SERPL-SCNC: 139 MEQ/L (ref 136–145)
SP GR UR STRIP: 1.03 (ref 1–1.03)
SQUAMOUS #/AREA URNS HPF: 2 /HPF (ref 0–5)
URINE LEUKOCYTE ESTERASE: (no result)
WBC # BLD AUTO: 12.4 TH/MM3 (ref 4–11)

## 2017-12-24 PROCEDURE — 83735 ASSAY OF MAGNESIUM: CPT

## 2017-12-24 PROCEDURE — 99284 EMERGENCY DEPT VISIT MOD MDM: CPT

## 2017-12-24 PROCEDURE — 81001 URINALYSIS AUTO W/SCOPE: CPT

## 2017-12-24 PROCEDURE — 84156 ASSAY OF PROTEIN URINE: CPT

## 2017-12-24 PROCEDURE — 80053 COMPREHEN METABOLIC PANEL: CPT

## 2017-12-24 PROCEDURE — 85027 COMPLETE CBC AUTOMATED: CPT

## 2017-12-24 PROCEDURE — 84550 ASSAY OF BLOOD/URIC ACID: CPT

## 2017-12-24 PROCEDURE — 96360 HYDRATION IV INFUSION INIT: CPT

## 2017-12-24 PROCEDURE — 59025 FETAL NON-STRESS TEST: CPT

## 2017-12-24 PROCEDURE — 82570 ASSAY OF URINE CREATININE: CPT

## 2017-12-24 NOTE — PD
HPI


Chief Complaint


Headache, abdominal pain


Travel History


International Travel<30 Days:  No


Contact w/Intl Traveler<30Days:  No


Known Affected Area:  No





History of Present Illness


HPI


32-year-old  002, IUP at 38.2





Prenatal care complicated by obesity





The patient presents complaining of onset of a headache at about 2 PM.  She 

reports that she took 3 extra strength Tylenol at 2 PM and another 3 just prior 

to coming.  She reports that she tried to go to sleep but was not able to 

sleep.  There are no other alleviating or aggravating factors.  The patient 

also reports that she has pelvic pressure and abdominal pressure.  She reports 

that her abdomen balls up into a hard ball and is uncomfortable.  This is been 

occurring all afternoon but is at irregular intervals, not even twice per hour.

  The patient also reports some tingling in the skin on her anterior thighs.  

This is only occurs when she is standing and resolves when she is at rest.  The 

patient reports good fetal movement.  She denies any leaking of fluid or 

vaginal bleeding.  She denies any painful contractions.  She denies any visual 

changes, right upper quadrant, or epigastric pain





History


Past Medical History


*** Narrative Medical


Obesity





Obstetric History


Obstetric History


 002


 2





Past Surgical History


*** Narrative Surgical


Bartholin's cyst





Family History


*** Narrative Family History


Denies





Social History


Alcohol Use:  No


Tobacco Use:  No


Substance Abuse:  No





Allergies-Medications


(Allergen,Severity, Reaction):  


Coded Allergies:  


     No Known Allergies (Verified  Adverse Reaction, Unknown, 17)


Home Meds


Active Scripts


Famotidine (Famotidine) 20 Mg Tab, 20 MG PO BID, #30 TAB 0 Refills


   Prov:Bridger Clark MD         17


Reported Medications


Potassium Chloride ER (K-Tab) 20 Meq Tab, 40 MEQ PO DAILY for Electrolyte 

Replacement, #2 TAB 0 Refills


   17


Pnv No.95/Ferrous Fum/Folic AC (Prenatal Vitamins Tablet) 28 Mg Iron-800 Mcg 

Tablet


   17





Review of Systems


Except as stated in HPI:  all other systems reviewed are Neg





Physical Exam


Narrative


GENERAL: Well-nourished, well-developed patient.


SKIN: Warm and dry.


HEAD: Normocephalic and atraumatic.


EYES: No scleral icterus. No injection or drainage. 


ENT: No nasal drainage noted. Mucous membranes pink. Airway patent.


NECK: Supple, trachea midline. No JVD.


CARDIOVASCULAR: Regular rate and rhythm without murmurs, gallops, or rubs. 


RESPIRATORY: Breath sounds equal bilaterally. No accessory muscle use.


BREASTS: Deferred


ABDOMEN/GI: Abdomen soft, non-tender, bowel sounds present, no rebound, no 

guarding 


   Gravid 


GENITOURINARY: 


   External Genitalia: intact and normal in appearance.  Normal BUS glands.  No 

cervical or vaginal masses appreciated.  Grossly normal rugae.  Physiologic 

discharge.  SVE 1/thick/high/posterior


FHT's: Fetal heart tones in the 150s with moderate long-term variability, good 

accelerations, no decelerations noted.  This is category 1 fetal heart rate 

tracing and reactive NST


EXTREMITIES: No cyanosis or edema.


BACK: Nontender without obvious deformity. 


NEUROLOGICAL: Awake and alert. Motor and sensory grossly within normal limits. 

Five out of 5 muscle strength in all muscle groups. Normal speech.  Anterior 

thigh with normal sensation, as well as sharp/dull discrimination.  Muscle 

strength is normal in her lower extremities.


Psychiatric: Grossly normal memory and affect


Musculoskeletal: Grossly normal range of motion, gait, muscle strength





Data


Data


Orders





 Orders


Vital Signs (Adult) .ON ADMISSION (17 18:44)


^ Labor Status (17 18:44)


^ Non Stress Test (17 18:44)


Urinalysis - C+S If Indicated (17 18:44)


Cbc No Diff, Includes Plts (17 18:44)


Comprehensive Metabolic Panel (17 18:44)


Dextrose 5%-Lactated Ring Inj (D5-Lr Inj (17 19:00)


Uric Acid (17 19:08)


Magnesium (Mg) (17 19:09)





MDM


Plan


Assessment/plan:


1.  IUP at 38.2


2.  Abdominal/pelvic pressure: No evidence of labor, labor precautions


3.  Headache: Although the patient has normal blood pressure, labs were checked 

to further assess this headache.  In addition to check her LFTs due to taking 

more than the recommended dose of Tylenol.  We discussed the recommended dose 

of 650 mg every 4 hours, up to 1000 mg every 4-6 hours with newer guidelines 

suggesting a maximum 3000 mg per day. No evidence of preeclampsia with normal 

PC ratio, normal platelets, normal LFTs, and normal blood pressure.  Strict 

preeclampsia precautions.  The patient's headache has improved significantly 

with IV fluids.


4.  Tingling: Patient had a normal neurological examination.  We discussed 

transfer to the ED for further evaluation and the patient declines.  Discussed 

that this is likely related to the uterus compressing nerves that provide 

sensation to the lower extremities.  Discussed comfort measures and use of 

caution when changing positions or ambulating.


5.  Fetal well-being: Reassuring  testing with reactive NST and 

category 1 fetal heart rate tracing.  Fetal kick counts daily.


6.  Mild hypokalemia: KCl 40 mEq were given by mouth with an additional dose 

for Rx for tomorrow.  Encouraged healthy diet and other sources of potassium.


7.  Follow up with primary OB in 2-3 days or sooner if needed; patient has 

appointment on 17


Diagnosis


Diagnosis:  


 Primary Impression:  


 38 weeks gestation of pregnancy


 Additional Impressions:  


 False labor at or after 37 completed weeks of gestation, antepartum


 Headache


Disposition:  01 DISCHARGE HOME











Jasmin Foster MD Dec 24, 2017 19:12

## 2017-12-27 ENCOUNTER — HOSPITAL ENCOUNTER (EMERGENCY)
Dept: HOSPITAL 17 - HOBED | Age: 32
Discharge: HOME | End: 2017-12-27
Payer: MEDICAID

## 2017-12-27 DIAGNOSIS — O99.213: ICD-10-CM

## 2017-12-27 DIAGNOSIS — R10.9: ICD-10-CM

## 2017-12-27 DIAGNOSIS — Z3A.38: ICD-10-CM

## 2017-12-27 DIAGNOSIS — O26.893: Primary | ICD-10-CM

## 2017-12-27 DIAGNOSIS — O36.8130: ICD-10-CM

## 2017-12-27 DIAGNOSIS — E66.9: ICD-10-CM

## 2017-12-27 LAB
BACTERIA #/AREA URNS HPF: (no result) /HPF
COLOR UR: YELLOW
GLUCOSE UR STRIP-MCNC: (no result) MG/DL
HGB UR QL STRIP: (no result)
KETONES UR STRIP-MCNC: (no result) MG/DL
NITRITE UR QL STRIP: (no result)
SP GR UR STRIP: 1.03 (ref 1–1.03)
SQUAMOUS #/AREA URNS HPF: 4 /HPF (ref 0–5)
URINE LEUKOCYTE ESTERASE: (no result)

## 2017-12-27 PROCEDURE — 99283 EMERGENCY DEPT VISIT LOW MDM: CPT

## 2017-12-27 PROCEDURE — 81001 URINALYSIS AUTO W/SCOPE: CPT

## 2017-12-27 NOTE — PD
HPI


Chief Complaint


38 weeks and 5 days pregnant


Reduced fetal movements 1 day


Abdominal cramping 1 day


Date Seen:  Dec 27, 2017


Time Seen:  17:30


Travel History


International Travel<30 Days:  No


Contact w/Intl Traveler<30Days:  No


Known Affected Area:  No





History of Present Illness


HPI


Pt is a 31 yo  at 38 weeks and 5 days.


EDC 2018


Prenatal care with Dr Graf. care previously uncomplicated but patient is 

obese.





Pt presents with reduced fetal movements today.


She reports abdominal cramping since 2017.


She was seen on OB ED same day , and told she had 'low potassium' and sent home 

with script for potassium supplements, which she has been taking.


She denies any vaginal bleeding or leaking.


Weeks Gestation:  38


Para:  2


:  3





History


Past Medical History


*** Narrative Medical


Obesity





Obstetric History


Obstetric History


2 term vaginal deliveries





Past Surgical History


*** Narrative Surgical


Marsupialization of Bartholin's Cyst


Surgical History:  No Previous Surgery





Family History


Family History:  Negative





Social History


Alcohol Use:  No


Tobacco Use:  No


Substance Abuse:  No





Allergies-Medications


(Allergen,Severity, Reaction):  


Coded Allergies:  


     No Known Allergies (Verified  Adverse Reaction, Unknown, 17)


Home Meds


Active Scripts


Famotidine (Famotidine) 20 Mg Tab, 20 MG PO BID, #30 TAB 0 Refills


   Prov:Bridger Clark MD         17


Reported Medications


Potassium Chloride ER (K-Tab) 20 Meq Tab, 40 MEQ PO DAILY for Electrolyte 

Replacement, #2 TAB 0 Refills


   17


Pnv No.95/Ferrous Fum/Folic AC (Prenatal Vitamins Tablet) 28 Mg Iron-800 Mcg 

Tablet


   17





Review of Systems


Except as stated in HPI:  all other systems reviewed are Neg





Physical Exam


Narrative


GENERAL: Well-nourished, well-developed patient.


SKIN: Warm and dry.


HEAD: Normocephalic and atraumatic.


EYES: No scleral icterus. No injection or drainage. 


ENT: No nasal drainage noted. Mucous membranes pink. Airway patent.


NECK: Supple, trachea midline. No JVD.


CARDIOVASCULAR: Regular rate and rhythm without murmurs, gallops, or rubs. 


RESPIRATORY: Breath sounds equal bilaterally. No accessory muscle use.


BREASTS: Bilateral exam showed no masses , no retractions, no nipple discharge.


ABDOMEN/GI: Abdomen soft, non-tender, bowel sounds present, no rebound, no 

guarding 


   Gravid to [38] weeks size


   Fundal Height: [38cm]


GENITOURINARY: 


   External Genitalia: intact and normal in appearance


   BUS glands: [wnl]


   Cervix: [soft]


   Dilatation: [closed]          


   Effacement: [50%]          


   Station: [-3]  


   Presentation: [vertex]        


   Membranes: [intact]


   Uterine Contractions: [rare]


FHT's: 


   Category: [1]   


   Baseline: [130s]   


   Reactive: [-]   


   Variability: [moderate]  


   Decels: [none]  


EXTREMITIES: No cyanosis or edema.


BACK: Nontender without obvious deformity. No CVA tenderness.


NEUROLOGICAL: Awake and alert. Motor and sensory grossly within normal limits. 

Five out of 5 muscle strength in all muscle groups. Normal speech.





Data


Data


Vital Signs Reviewed:  Yes





MDM


Medical Record Reviewed:  Yes


Plan


Pt at 38 weeks and 5 days.


Reduced fetal movements, but now feeling since admission.


NST Cat 1.


Will send UA 


UA wnl


Diagnosis


Diagnosis:  


 Primary Impression:  


 Pregnancy with 38 completed weeks gestation


 Additional Impressions:  


 Decreased fetal movement


 Abdominal cramping affecting pregnancy


Disposition:  01 DISCHARGE HOME


Condition:  Good











Jason Muniz MD Dec 27, 2017 17:50

## 2017-12-30 ENCOUNTER — HOSPITAL ENCOUNTER (EMERGENCY)
Dept: HOSPITAL 17 - HOBED | Age: 32
Discharge: HOME | End: 2017-12-30
Payer: MEDICAID

## 2017-12-30 DIAGNOSIS — O99.213: ICD-10-CM

## 2017-12-30 DIAGNOSIS — O26.893: Primary | ICD-10-CM

## 2017-12-30 DIAGNOSIS — Z3A.39: ICD-10-CM

## 2017-12-30 DIAGNOSIS — R10.9: ICD-10-CM

## 2017-12-30 PROCEDURE — 59025 FETAL NON-STRESS TEST: CPT

## 2017-12-30 NOTE — PD
HPI


Chief Complaint


39 weeks and 1 day pregnant


Abdominal cramping 5 weeks


Date Seen:  Dec 30, 2017


Time Seen:  20:30


Travel History


International Travel<30 Days:  No


Contact w/Intl Traveler<30Days:  No


Known Affected Area:  No





History of Present Illness


HPI


Pt is a 31 yo  at 39 weeks and 1 day.


Presents to ED with c/o continuing abdominal cramping.


Pt has had symptoms since 2017.


She was seen on ED 2 days ago and discharged home.





She denies any vaginal bleeding or leaking.


Active fetal movements.


Denies any urinary symptoms.\


No GI symptoms.


Weeks Gestation:  39


Para:  2


:  3





History


Past Medical History


*** Narrative Medical


Obesity





Obstetric History


Obstetric History


 x 2, term





Past Surgical History


*** Narrative Surgical


Bartholin's cyst marsupialization


Surgical History:  No Previous Surgery





Social History


Alcohol Use:  No


Tobacco Use:  No


Substance Abuse:  No





Allergies-Medications


(Allergen,Severity, Reaction):  


Coded Allergies:  


     No Known Allergies (Verified  Adverse Reaction, Unknown, 17)


Home Meds


Active Scripts


Famotidine (Famotidine) 20 Mg Tab, 20 MG PO BID, #30 TAB 0 Refills


   Prov:Bridger Clark MD         17


Reported Medications


Potassium Chloride ER (K-Tab) 20 Meq Tab, 40 MEQ PO DAILY for Electrolyte 

Replacement, #2 TAB 0 Refills


   17


Pnv No.95/Ferrous Fum/Folic AC (Prenatal Vitamins Tablet) 28 Mg Iron-800 Mcg 

Tablet


   17





Review of Systems


Except as stated in HPI:  all other systems reviewed are Neg





Physical Exam


Narrative


GENERAL: Well-nourished, well-developed patient.


SKIN: Warm and dry.


HEAD: Normocephalic and atraumatic.


EYES: No scleral icterus. No injection or drainage. 


ENT: No nasal drainage noted. Mucous membranes pink. Airway patent.


NECK: Supple, trachea midline. No JVD.


CARDIOVASCULAR: Regular rate and rhythm without murmurs, gallops, or rubs. 


RESPIRATORY: Breath sounds equal bilaterally. No accessory muscle use.


BREASTS: Bilateral exam showed no masses , no retractions, no nipple discharge.


ABDOMEN/GI: Abdomen soft, non-tender, bowel sounds present, no rebound, no 

guarding 


   Gravid to [39] weeks size


   Fundal Height: [40cm]


GENITOURINARY: 


   External Genitalia: intact and normal in appearance


   BUS glands: [wnl]


   Cervix: [soft]


   Dilatation: [closed]          


   Effacement: [thick, long]          


   Station: [-3]  


   Presentation: [vertex]        


   Membranes: [intact]


   Uterine Contractions: [none]


FHT's: 


   Category: [1]   


   Baseline: [130s]   


   Reactive: [-]   


   Variability: [moderate]  


   Decels: [none]  


EXTREMITIES: No cyanosis or edema.


BACK: Nontender without obvious deformity. No CVA tenderness.


NEUROLOGICAL: Awake and alert. Motor and sensory grossly within normal limits. 

Five out of 5 muscle strength in all muscle groups. Normal speech.





Data


Data


Vital Signs Reviewed:  Yes





MDM


Diagnosis


Diagnosis:  


 Primary Impression:  


 Pregnancy with 39 completed weeks gestation


 Additional Impression:  


 Abdominal pain during pregnancy, antepartum


Disposition:  01 DISCHARGE HOME


Condition:  Good


Patient Instructions:  General Instructions, Having Your Baby: The Labor 

Process (GEN)





**Additional Instructions**:  


RETURN FOR CONTRACTIONS, LOSS OF FLUID (WATER BREAKING), VAGINAL BLEEDING, OR 

DECREASED FETAL MOVEMENT





DRINK 8-10 LARGE GLASSES OF WATER EVERY DAY





KEEP SCHEDULED APPOINTMENT WITH YOUR PROVIDER


Departure Forms:  Tests/Procedures











Jason Muniz MD Dec 30, 2017 20:41

## 2018-01-04 ENCOUNTER — HOSPITAL ENCOUNTER (INPATIENT)
Dept: HOSPITAL 17 - H2EB | Age: 33
LOS: 2 days | Discharge: HOME | End: 2018-01-06
Attending: OBSTETRICS & GYNECOLOGY | Admitting: OBSTETRICS & GYNECOLOGY
Payer: MEDICAID

## 2018-01-04 VITALS — DIASTOLIC BLOOD PRESSURE: 53 MMHG | SYSTOLIC BLOOD PRESSURE: 81 MMHG | HEART RATE: 86 BPM

## 2018-01-04 VITALS — DIASTOLIC BLOOD PRESSURE: 78 MMHG | HEART RATE: 75 BPM | SYSTOLIC BLOOD PRESSURE: 116 MMHG

## 2018-01-04 VITALS — HEART RATE: 97 BPM | SYSTOLIC BLOOD PRESSURE: 92 MMHG | DIASTOLIC BLOOD PRESSURE: 71 MMHG

## 2018-01-04 VITALS — HEART RATE: 100 BPM | SYSTOLIC BLOOD PRESSURE: 103 MMHG | DIASTOLIC BLOOD PRESSURE: 50 MMHG

## 2018-01-04 VITALS — RESPIRATION RATE: 18 BRPM | TEMPERATURE: 97.8 F

## 2018-01-04 VITALS — DIASTOLIC BLOOD PRESSURE: 62 MMHG | SYSTOLIC BLOOD PRESSURE: 107 MMHG | HEART RATE: 86 BPM

## 2018-01-04 VITALS — SYSTOLIC BLOOD PRESSURE: 104 MMHG | HEART RATE: 88 BPM | DIASTOLIC BLOOD PRESSURE: 51 MMHG

## 2018-01-04 VITALS — SYSTOLIC BLOOD PRESSURE: 100 MMHG | HEART RATE: 91 BPM | DIASTOLIC BLOOD PRESSURE: 58 MMHG

## 2018-01-04 VITALS — SYSTOLIC BLOOD PRESSURE: 115 MMHG | DIASTOLIC BLOOD PRESSURE: 45 MMHG | HEART RATE: 99 BPM

## 2018-01-04 VITALS — HEART RATE: 91 BPM | DIASTOLIC BLOOD PRESSURE: 45 MMHG | SYSTOLIC BLOOD PRESSURE: 100 MMHG

## 2018-01-04 VITALS — DIASTOLIC BLOOD PRESSURE: 52 MMHG | HEART RATE: 87 BPM | SYSTOLIC BLOOD PRESSURE: 101 MMHG

## 2018-01-04 VITALS — HEART RATE: 87 BPM | SYSTOLIC BLOOD PRESSURE: 113 MMHG | DIASTOLIC BLOOD PRESSURE: 56 MMHG

## 2018-01-04 VITALS — DIASTOLIC BLOOD PRESSURE: 69 MMHG | HEART RATE: 72 BPM | SYSTOLIC BLOOD PRESSURE: 114 MMHG

## 2018-01-04 VITALS — SYSTOLIC BLOOD PRESSURE: 97 MMHG | DIASTOLIC BLOOD PRESSURE: 45 MMHG | HEART RATE: 82 BPM

## 2018-01-04 VITALS — SYSTOLIC BLOOD PRESSURE: 118 MMHG | HEART RATE: 90 BPM | DIASTOLIC BLOOD PRESSURE: 67 MMHG

## 2018-01-04 VITALS — DIASTOLIC BLOOD PRESSURE: 77 MMHG | HEART RATE: 92 BPM | RESPIRATION RATE: 18 BRPM | SYSTOLIC BLOOD PRESSURE: 103 MMHG

## 2018-01-04 VITALS — SYSTOLIC BLOOD PRESSURE: 111 MMHG | HEART RATE: 99 BPM | DIASTOLIC BLOOD PRESSURE: 70 MMHG

## 2018-01-04 VITALS — SYSTOLIC BLOOD PRESSURE: 111 MMHG | HEART RATE: 104 BPM | DIASTOLIC BLOOD PRESSURE: 54 MMHG

## 2018-01-04 VITALS
RESPIRATION RATE: 18 BRPM | DIASTOLIC BLOOD PRESSURE: 68 MMHG | HEART RATE: 86 BPM | SYSTOLIC BLOOD PRESSURE: 101 MMHG | TEMPERATURE: 98.4 F

## 2018-01-04 VITALS — DIASTOLIC BLOOD PRESSURE: 47 MMHG | HEART RATE: 111 BPM | SYSTOLIC BLOOD PRESSURE: 110 MMHG

## 2018-01-04 VITALS — RESPIRATION RATE: 18 BRPM

## 2018-01-04 VITALS — DIASTOLIC BLOOD PRESSURE: 68 MMHG | HEART RATE: 76 BPM | SYSTOLIC BLOOD PRESSURE: 116 MMHG

## 2018-01-04 VITALS — DIASTOLIC BLOOD PRESSURE: 84 MMHG | HEART RATE: 132 BPM | SYSTOLIC BLOOD PRESSURE: 117 MMHG

## 2018-01-04 VITALS — SYSTOLIC BLOOD PRESSURE: 118 MMHG | RESPIRATION RATE: 18 BRPM | DIASTOLIC BLOOD PRESSURE: 70 MMHG | HEART RATE: 111 BPM

## 2018-01-04 VITALS — DIASTOLIC BLOOD PRESSURE: 49 MMHG | HEART RATE: 89 BPM | SYSTOLIC BLOOD PRESSURE: 102 MMHG

## 2018-01-04 VITALS — SYSTOLIC BLOOD PRESSURE: 103 MMHG | HEART RATE: 90 BPM | DIASTOLIC BLOOD PRESSURE: 48 MMHG

## 2018-01-04 VITALS — WEIGHT: 231.49 LBS | HEIGHT: 60 IN | BODY MASS INDEX: 45.45 KG/M2

## 2018-01-04 VITALS — DIASTOLIC BLOOD PRESSURE: 57 MMHG | HEART RATE: 86 BPM | SYSTOLIC BLOOD PRESSURE: 99 MMHG

## 2018-01-04 VITALS — DIASTOLIC BLOOD PRESSURE: 57 MMHG | SYSTOLIC BLOOD PRESSURE: 112 MMHG | HEART RATE: 98 BPM

## 2018-01-04 VITALS — HEART RATE: 68 BPM

## 2018-01-04 VITALS — DIASTOLIC BLOOD PRESSURE: 68 MMHG | HEART RATE: 85 BPM | SYSTOLIC BLOOD PRESSURE: 101 MMHG

## 2018-01-04 VITALS — TEMPERATURE: 97.5 F

## 2018-01-04 VITALS — SYSTOLIC BLOOD PRESSURE: 129 MMHG | DIASTOLIC BLOOD PRESSURE: 58 MMHG | HEART RATE: 91 BPM

## 2018-01-04 VITALS — SYSTOLIC BLOOD PRESSURE: 102 MMHG | HEART RATE: 78 BPM | DIASTOLIC BLOOD PRESSURE: 40 MMHG

## 2018-01-04 VITALS — SYSTOLIC BLOOD PRESSURE: 105 MMHG | HEART RATE: 94 BPM | DIASTOLIC BLOOD PRESSURE: 69 MMHG

## 2018-01-04 VITALS — HEART RATE: 77 BPM

## 2018-01-04 VITALS — HEART RATE: 66 BPM

## 2018-01-04 DIAGNOSIS — Z3A.39: ICD-10-CM

## 2018-01-04 DIAGNOSIS — M54.5: ICD-10-CM

## 2018-01-04 DIAGNOSIS — R05: ICD-10-CM

## 2018-01-04 LAB
AMORPHOUS SEDIMENT, URINE: (no result)
BACTERIA #/AREA URNS HPF: (no result) /HPF
BASOPHILS # BLD AUTO: 0 TH/MM3 (ref 0–0.2)
BASOPHILS NFR BLD: 0.2 % (ref 0–2)
COLOR UR: YELLOW
EOSINOPHIL # BLD: 0.1 TH/MM3 (ref 0–0.4)
EOSINOPHIL NFR BLD: 0.4 % (ref 0–4)
ERYTHROCYTE [DISTWIDTH] IN BLOOD BY AUTOMATED COUNT: 14.4 % (ref 11.6–17.2)
GLUCOSE UR STRIP-MCNC: (no result) MG/DL
HCT VFR BLD CALC: 35.1 % (ref 35–46)
HGB BLD-MCNC: 11.9 GM/DL (ref 11.6–15.3)
HGB UR QL STRIP: (no result)
KETONES UR STRIP-MCNC: (no result) MG/DL
LYMPHOCYTES # BLD AUTO: 3.1 TH/MM3 (ref 1–4.8)
LYMPHOCYTES NFR BLD AUTO: 24.4 % (ref 9–44)
MCH RBC QN AUTO: 30.3 PG (ref 27–34)
MCHC RBC AUTO-ENTMCNC: 34 % (ref 32–36)
MCV RBC AUTO: 89.2 FL (ref 80–100)
MONOCYTE #: 0.8 TH/MM3 (ref 0–0.9)
MONOCYTES NFR BLD: 6.6 % (ref 0–8)
MUCOUS THREADS #/AREA URNS LPF: (no result) /LPF
NEUTROPHILS # BLD AUTO: 8.7 TH/MM3 (ref 1.8–7.7)
NEUTROPHILS NFR BLD AUTO: 68.4 % (ref 16–70)
NITRITE UR QL STRIP: (no result)
PLATELET # BLD: 356 TH/MM3 (ref 150–450)
PMV BLD AUTO: 8.5 FL (ref 7–11)
RBC # BLD AUTO: 3.94 MIL/MM3 (ref 4–5.3)
SP GR UR STRIP: 1.03 (ref 1–1.03)
SQUAMOUS #/AREA URNS HPF: 4 /HPF (ref 0–5)
URINE LEUKOCYTE ESTERASE: (no result)
WBC # BLD AUTO: 12.7 TH/MM3 (ref 4–11)

## 2018-01-04 PROCEDURE — 90715 TDAP VACCINE 7 YRS/> IM: CPT

## 2018-01-04 PROCEDURE — 59025 FETAL NON-STRESS TEST: CPT

## 2018-01-04 PROCEDURE — 81001 URINALYSIS AUTO W/SCOPE: CPT

## 2018-01-04 PROCEDURE — 85025 COMPLETE CBC W/AUTO DIFF WBC: CPT

## 2018-01-04 PROCEDURE — 86901 BLOOD TYPING SEROLOGIC RH(D): CPT

## 2018-01-04 PROCEDURE — 3E033VJ INTRODUCTION OF OTHER HORMONE INTO PERIPHERAL VEIN, PERCUTANEOUS APPROACH: ICD-10-PCS | Performed by: OBSTETRICS & GYNECOLOGY

## 2018-01-04 PROCEDURE — 86900 BLOOD TYPING SEROLOGIC ABO: CPT

## 2018-01-04 PROCEDURE — 80307 DRUG TEST PRSMV CHEM ANLYZR: CPT

## 2018-01-04 PROCEDURE — 10907ZC DRAINAGE OF AMNIOTIC FLUID, THERAPEUTIC FROM PRODUCTS OF CONCEPTION, VIA NATURAL OR ARTIFICIAL OPENING: ICD-10-PCS | Performed by: OBSTETRICS & GYNECOLOGY

## 2018-01-04 RX ADMIN — IBUPROFEN PRN MG: 800 TABLET, FILM COATED ORAL at 20:45

## 2018-01-04 RX ADMIN — PENICILLIN G POTASSIUM SCH MLS/HR: 5000000 INJECTION, POWDER, FOR SOLUTION INTRAMUSCULAR; INTRAVENOUS at 18:00

## 2018-01-04 RX ADMIN — ACETAMINOPHEN PRN MG: 325 TABLET ORAL at 20:45

## 2018-01-04 RX ADMIN — OXYTOCIN SCH MLS/HR: 10 INJECTION, SOLUTION INTRAMUSCULAR; INTRAVENOUS at 15:35

## 2018-01-04 RX ADMIN — PENICILLIN G POTASSIUM SCH MLS/HR: 5000000 INJECTION, POWDER, FOR SOLUTION INTRAMUSCULAR; INTRAVENOUS at 22:00

## 2018-01-04 RX ADMIN — OXYTOCIN SCH MLS/HR: 10 INJECTION, SOLUTION INTRAMUSCULAR; INTRAVENOUS at 13:57

## 2018-01-04 NOTE — PD.OB.DELI
Weeks gestation:  39


Gest age assessed date:  2018


Pt started active labor?:  Yes


Medical induction of labor?:  Yes


Artificial rupture of membrane:  Yes


Artificial ROM date:  2018


Artifical ROM time:  13:30


Anesthesia:  Epidural


Episiotomy:  None


Vaginal Delivery:  Normal


Nuchal Cord:  x2


Delayed cord clamping (45 sec):  Yes


Infant:  Female


Delivery date:  2018


Delivery time:  17:33


One Minute APGAR:  8


Five Minute APGAR:  9


Birth Weight:  6/1


Placenta:  Spontaneous delivery, Intact, 3 vessel cord


Laceration:  No lacerations


Estimated blood loss:  300CC


Additional Information


Nice delivery of Fred.


Double nuchal cord that was reduced on the perineum.


No OB lacerations.


Many family members present for delivery











GOPAL Graf MD 2018 19:47

## 2018-01-04 NOTE — HHI.HP
HPI


Chief Complaint


Intrauterine pregnancy at 39 weeks 1 day.  Having tremendous pain and pressure 

for the last several weeks.  Had her cervix checked in the office and she was 4 

cm and wanted to be induced


Date Seen:  2018


Time Seen:  13:30


Travel History


International Travel<30 Days:  No


Contact w/Intl Traveler<30Days:  No


Known Affected Area:  No





History of Present Illness


HPI


This is a 32-year-old patient who has been miserable for the last 3 weeks with 

pelvic pressure and pelvic pain.  She is now over 39 weeks her cervix is 4 cm 

and she is requesting induction which I think is reasonable


Weeks Gestation:  39


Para:  2


:  3


Miscarriage:  0





History


Past Medical History


Medical History:  Denies Significant Hx





Obstetric History


Obstetric History


Positive GBS, para 2002.


 2


History of Trichomonas test of cure was negative





Past Surgical History


*** Narrative Surgical


Denies


Surgical History:  No Previous Surgery





Family History


Family History:  Negative





Social History


Alcohol Use:  No


Tobacco Use:  No


Substance Abuse:  No





Allergies-Medications


(Allergen,Severity, Reaction):  


Coded Allergies:  


     No Known Allergies (Verified  Adverse Reaction, Unknown, 17)


Home Meds


Active Scripts


Famotidine (Famotidine) 20 Mg Tab, 20 MG PO BID, #30 TAB 0 Refills


   Prov:Bridger Clark MD         17


Reported Medications


Pnv No.95/Ferrous Fum/Folic AC (Prenatal Vitamins Tablet) 28 Mg Iron-800 Mcg 

Tablet


   17


Discontinued Reported Medications


Potassium Chloride ER (K-Tab) 20 Meq Tab, 40 MEQ PO DAILY for Electrolyte 

Replacement, #2 TAB 0 Refills


   17





Review of Systems


Except as stated in HPI:  all other systems reviewed are Neg





Physical Exam





Vital Signs








  Date Time  Temp Pulse Resp B/P (MAP) Pulse Ox O2 Delivery O2 Flow Rate FiO2


 


18 19:01  94  105/69 (81)    


 


18 18:45  85  101/68 (79)    


 


18 18:35   18     


 


18 18:30  86  107/62 (77)    


 


18 18:15  92 18 103/77 (86)    


 


18 18:01  97  92/71 (78)    


 


18 17:57 97.8       


 


18 17:57   18     


 


18 17:46  100  103/50 (67)    


 


18 17:45  104  111/54 (73)    


 


18 17:44   18     


 


18 17:32  111  110/47 (68)    


 


18 17:15   18     


 


18 17:01  132  117/84 (95)    


 


18 16:29  111 18 118/70 (86)    


 


18 16:03  99  115/45 (68)    


 


18 15:46  91  100/58 (72)    


 


18 15:37  91  129/58 (81)    


 


18 15:31  86  81/53 (62)    


 


18 15:30 97.5       


 


18 15:26   18     


 


18 15:25  88  104/51 (68)    


 


18 15:21  91  100/45 (63)    


 


18 15:20  98  112/57 (75)    


 


18 15:16  86  99/57 (71)    


 


18 15:10  90  118/67 (84)    


 


18 15:06  82  97/45 (62)    


 


18 15:00  89      


 


18 15:00  90  102/49 (66)    


 


18 14:55  90  103/48 (66)    


 


18 14:55  89      


 


18 14:50  90      


 


18 14:50  87  101/52 (68)    


 


18 14:45  81      


 


18 14:45  76  116/68 (84)    


 


18 14:44  75  116/78 (91)    


 


18 14:40  77      


 


18 14:39  78  102/40 (60)    


 


18 14:35  68      


 


18 14:30  66      


 


18 14:17  72  114/69 (84)    


 


18 14:16   18     








Narrative


GENERAL: Well-nourished, well-developed patient.


SKIN: Warm and dry.


HEAD: Normocephalic and atraumatic.


EYES: No scleral icterus. No injection or drainage. 


ENT: No nasal drainage noted. Mucous membranes pink. Airway patent.


NECK: Supple, trachea midline. No JVD.


CARDIOVASCULAR: Regular rate and rhythm without murmurs, gallops, or rubs. 


RESPIRATORY: Breath sounds equal bilaterally. No accessory muscle use.


BREASTS: Bilateral exam showed no masses , no retractions, no nipple discharge.


ABDOMEN/GI: Abdomen soft, non-tender, bowel sounds present, no rebound, no 

guarding 


   Gravid to [39] weeks size


   Fundal Height: [39]


GENITOURINARY: 


   External Genitalia: intact and normal in appearance


   BUS glands: [-]


   Cervix: []


   Dilatation 4 cm          


   Effacement: [80%-]          


   Station: [-2-]  


   Presentation: [-]        


   Membranes: [intact]


   Uterine Contractions: [-]


FHT's: 


   Category: [1]   


   Baseline: [-]   


   Reactive: [-]   


   Variability: [-]  


   Decels: [-]  


EXTREMITIES: No cyanosis or edema.


BACK: Nontender without obvious deformity. No CVA tenderness.


NEUROLOGICAL: Awake and alert. Motor and sensory grossly within normal limits. 

Five out of 5 muscle strength in all muscle groups. Normal speech.





Caprini VTE Risk Assessment


Caprini VTE Risk Assessment:  No/Low Risk (score <= 1)


VTE Pharm Contraindication:  Epidural catheter


Caprini Risk Assessment Model











 Point Value = 1          Point Value = 2  Point Value = 3  Point Value = 5


 


Age 41-60


Minor surgery


BMI > 25 kg/m2


Swollen legs


Varicose veins


Pregnancy or postpartum


History of unexplained or recurrent


   spontaneous 


Oral contraceptives or hormone


   replacement


Sepsis (< 1 month)


Serious lung disease, including


   pneumonia (< 1 month)


Abnormal pulmonary function


Acute myocardial infarction


Congestive heart failure (< 1 month)


History of inflammatory bowel disease


Medical patient at bed rest Age 61-74


Arthroscopic surgery


Major open surgery (> 45 min)


Laparoscopic surgery (> 45 min)


Malignancy


Confined to bed (> 72 hours)


Immobilizing plaster cast


Central venous access Age >= 75


History of VTE


Family history of VTE


Factor V Leiden


Prothrombin 38713B


Lupus anticoagulant


Anticardiolipin antibodies


Elevated serum homocysteine


Heparin-induced thrombocytopenia


Other congenital or acquired


   thrombophilia Stroke (< 1 month)


Elective arthroplasty


Hip, pelvis, or leg fracture


Acute spinal cord injury (< 1 month)








Prophylaxis Regimen











   Total Risk


Factor Score Risk Level Prophylaxis Regimen


 


0-1      Low Early ambulation


 


2 Moderate Order ONE of the following:


*Sequential Compression Device (SCD)


*Heparin 5000 units SQ BID


 


3-4 Higher Order ONE of the following medications:


*Heparin 5000 units SQ TID


*Enoxaparin/Lovenox 40 mg SQ daily (WT < 150 kg, CrCl > 30 mL/min)


*Enoxaparin/Lovenox 30 mg SQ daily (WT < 150 kg, CrCl > 10-29 mL/min)


*Enoxaparin/Lovenox 30 mg SQ BID (WT < 150 kg, CrCl > 30 mL/min)


AND/OR


*Sequential Compression Device (SCD)


 


5 or more Highest Order ONE of the following medications:


*Heparin 5000 units SQ TID (Preferred with Epidurals)


*Enoxaparin/Lovenox 40 mg SQ daily (WT < 150 kg, CrCl > 30 mL/min)


*Enoxaparin/Lovenox 30 mg SQ daily (WT < 150 kg, CrCl > 10-29 mL/min)


*Enoxaparin/Lovenox 30 mg SQ BID (WT < 150 kg, CrCl > 30 mL/min)


AND


*Sequential Compression Device (SCD)











Data


Data


Orders





 Orders


Admit To Inpatient (18 )


Code Status (18 13:27)


Vital Signs (Adult) .Per protocol (18 13:27)


Activity Oob Ad Mony (18 13:27)


Fetal Heart (18 13:27)


Amnioinfusion (18 13:27)


Urinary Catheter Management .ONCE (18 13:27)


Diet Liquid (18 Lunch)


Lactated Ringer's 1000 Ml Inj (Lr 1000 M (18 13:27)


Lactated Ringer's 1000 Ml Inj (Lr 1000 M (18 13:27)


Sodium Chlorid 0.9% 500 Ml Inj (Ns 500 M (18 13:30)


Sodium Chlor 0.9% 1000 Ml Inj (Ns 1000 M (18 13:47)


Lidocaine 1% Inj (50 Ml) (Xylocaine 1% I (18 13:30)


Citric Acid-Sodium Citrate Liq (Bicitra (18 13:30)


Fentanyl Inj (Fentanyl Inj) (18 13:30)


Fentanyl Inj (Fentanyl Inj) (18 13:30)


Complete Blood Count With Diff (18 13:27)


Hold Clot (18 13:27)


Abo/Rh Blood Type (18 13:27)


Urinalysis - C+S If Indicated (18 13:27)


Drug Screen, Random Urine (18 13:27)


Resp Oxygen Non Rebreathe Mask (18 )


^ Epidural / Intrathecal Infus (18 13:27)


Oxytocin 30 Units-500ml Premix (Pitocin (18 13:30)


Lidocaine 1% Inj (50 Ml) (Xylocaine 1% I (18 13:30)


Light Mineral Oil (Muri-Lube Oil) (18 13:30)


^ Non Stress Test (18 13:27)


Response To Medication .Post New Med Administration, Reaction (18 13:27)


^ Discontinue Medication (18 13:27)


Oxytocin 30 Units-500ml Premix (Pitocin (18 13:30)


Inpatient Certification (18 )


Specimen To Be Collected PRN (18 13:27)


Specimen To Be Collected PRN (18 13:27)


Penicillin G Potassium Inj (Pfizerpen-G (18 14:00)


Penicillin G Potassium Inj (Pfizerpen-G (18 18:00)


Fentanyl 2mcg-Bupiv 0.125% Inj (Fentanyl (18 13:37)


Ephedrine/Ns 25 Mg/5 Ml Syr (Ephedrine/N (18 13:37)


^ Place On Chart (18 )


^ Medication Indications (18 )


Consent (18 )


^ No Systemic Narcotics (18 )


^ Call Anesthesiologist (18 )


^ Discontinue Epidural Cathete (18 )


Anticoagulant Alert (18 )


^ Epidural Alert (18 )


Oxytocin 30 Units-500ml Premix (Pitocin (18 18:00)


Vital Signs (Adult) .QSHIFT (18 17:47)


Activity Oob Ad Mony (18 17:47)


Ice / Cold Pack PRN (18 17:47)


Discontinue Iv (18 17:47)


Sitz Bath PRN (18 17:47)


^ Massage (18 17:47)


^ Rhogam (18 17:47)


Urinary Catheter Management .PRN (18 17:47)


Diet Regular Basic (18 Dinner)


Sodium Chloride 0.9% Flush (Ns Flush) (18 21:00)


Sodium Chloride 0.9% Flush (Ns Flush) (18 18:00)


Oxytocin 30 Units-500ml Premix (Pitocin (18 18:00)


Acetaminophen (Tylenol) (18 18:00)


Ibuprofen (Motrin) (18 18:00)


Benzocaine 20% Top Spr (Americaine 20% T (18 18:00)


Witch Hazel-Glycerin Pad (Tucks Pads) (18 18:00)


Docusate Sodium-Senna (Kareen-Colace) (18 18:00)


Measles-Mumps-Rubella Inj (M-M-R Ii Inj) (18 16:00)


Cfgg-Ttc-Jeagiy (Booster) Inj (Boostrix (18 16:00)


Al-Mag Hy-Si 40-40-4 Mg/Ml Liq (Mag-Al P (18 18:00)


Ondansetron  Odt (Zofran  Odt) (18 18:00)


Misc Nursing Information (18 18:15)


Misc Nursing Information (18 18:15)


Fentanyl Inj (Fentanyl Inj) (18 18:15)


Fentanyl 2mcg-Bupiv 0.125% Inj (Fentanyl (18 18:15)


Ephedrine/Ns 25 Mg/5 Ml Syr (Ephedrine/N (18 18:15)


Labs





Laboratory Tests








Test


  18


13:30


 


White Blood Count 12.7 


 


Red Blood Count 3.94 


 


Hemoglobin 11.9 


 


Hematocrit 35.1 


 


Mean Corpuscular Volume 89.2 


 


Mean Corpuscular Hemoglobin 30.3 


 


Mean Corpuscular Hemoglobin


Concent 34.0 


 


 


Red Cell Distribution Width 14.4 


 


Platelet Count 356 


 


Mean Platelet Volume 8.5 


 


Neutrophils (%) (Auto) 68.4 


 


Lymphocytes (%) (Auto) 24.4 


 


Monocytes (%) (Auto) 6.6 


 


Eosinophils (%) (Auto) 0.4 


 


Basophils (%) (Auto) 0.2 


 


Neutrophils # (Auto) 8.7 


 


Lymphocytes # (Auto) 3.1 


 


Monocytes # (Auto) 0.8 


 


Eosinophils # (Auto) 0.1 


 


Basophils # (Auto) 0.0 


 


CBC Comment DIFF FINAL 


 


Differential Comment  


 


Urine Color YELLOW 


 


Urine Turbidity HAZY 


 


Urine pH 6.5 


 


Urine Specific Gravity 1.026 


 


Urine Protein 30 


 


Urine Glucose (UA) NEG 


 


Urine Ketones NEG 


 


Urine Occult Blood TRACE 


 


Urine Nitrite NEG 


 


Urine Bilirubin NEG 


 


Urine Urobilinogen LESS THAN 2.0 


 


Urine Leukocyte Esterase SMALL 


 


Urine RBC 1 


 


Urine WBC 3 


 


Urine Squamous Epithelial


Cells 4 


 


 


Urine Amorphous Sediment RARE 


 


Urine Bacteria FEW 


 


Urine Mucus FEW 


 


Microscopic Urinalysis Comment


  CULT NOT


INDICATED


 


Urine Opiates Screen NEG 


 


Urine Barbiturates Screen NEG 


 


Urine Amphetamines Screen NEG 


 


Urine Benzodiazepines Screen NEG 


 


Urine Cocaine Screen NEG 


 


Urine Cannabinoids Screen NEG 











Assessment/Plan


Assessment and Plan


1.  Intrauterine pregnancy at 39+ weeks


2.  GBS positive we'll go ahead and treat her with penicillin


3.  Visible cervix and patient has been miserable for several weeks will go 

ahead and induce her I expect her to have a easy normal vaginal delivery her 

last delivery was in just a couple hours I expect this will be similar


4.  Chronic cough, she has had this cough for several months hopefully this 

will improve and we'll follow this in the hospital











GOPAL Graf MD 2018 19:44

## 2018-01-05 VITALS
OXYGEN SATURATION: 97 % | TEMPERATURE: 98.3 F | RESPIRATION RATE: 18 BRPM | DIASTOLIC BLOOD PRESSURE: 69 MMHG | SYSTOLIC BLOOD PRESSURE: 103 MMHG | HEART RATE: 105 BPM

## 2018-01-05 VITALS
HEART RATE: 78 BPM | RESPIRATION RATE: 18 BRPM | TEMPERATURE: 98.4 F | SYSTOLIC BLOOD PRESSURE: 106 MMHG | DIASTOLIC BLOOD PRESSURE: 63 MMHG | OXYGEN SATURATION: 98 %

## 2018-01-05 RX ADMIN — ACETAMINOPHEN PRN MG: 325 TABLET ORAL at 02:54

## 2018-01-05 RX ADMIN — ACETAMINOPHEN PRN MG: 325 TABLET ORAL at 11:41

## 2018-01-05 RX ADMIN — IBUPROFEN PRN MG: 800 TABLET, FILM COATED ORAL at 20:55

## 2018-01-05 RX ADMIN — IBUPROFEN PRN MG: 800 TABLET, FILM COATED ORAL at 06:38

## 2018-01-05 NOTE — HHI.OB
Subjective


Post Partum Day:  1





Objective


Vitals/I&O





Vital Signs








  Date Time  Temp Pulse Resp B/P (MAP) Pulse Ox O2 Delivery O2 Flow Rate FiO2


 


1/5/18 08:00 98.4  18 106/63 (77) 98   


 


1/5/18 08:00  78      


 


1/4/18 20:30 98.4 86 18 101/68 (79)    


 


1/4/18 19:58   18     


 


1/4/18 19:30  87  113/56 (75)    


 


1/4/18 19:16  99  111/70 (84)    


 


1/4/18 19:01  94  105/69 (81)    


 


1/4/18 18:45  85  101/68 (79)    


 


1/4/18 18:35   18     


 


1/4/18 18:30  86  107/62 (77)    


 


1/4/18 18:15  92 18 103/77 (86)    


 


1/4/18 18:01  97  92/71 (78)    


 


1/4/18 17:57 97.8       


 


1/4/18 17:57   18     


 


1/4/18 17:46  100  103/50 (67)    


 


1/4/18 17:45  104  111/54 (73)    


 


1/4/18 17:44   18     


 


1/4/18 17:32  111  110/47 (68)    


 


1/4/18 17:15   18     


 


1/4/18 17:01  132  117/84 (95)    


 


1/4/18 16:29  111 18 118/70 (86)    


 


1/4/18 16:03  99  115/45 (68)    


 


1/4/18 15:46  91  100/58 (72)    


 


1/4/18 15:37  91  129/58 (81)    


 


1/4/18 15:31  86  81/53 (62)    


 


1/4/18 15:30 97.5       


 


1/4/18 15:26   18     


 


1/4/18 15:25  88  104/51 (68)    


 


1/4/18 15:21  91  100/45 (63)    


 


1/4/18 15:20  98  112/57 (75)    


 


1/4/18 15:16  86  99/57 (71)    


 


1/4/18 15:10  90  118/67 (84)    


 


1/4/18 15:06  82  97/45 (62)    


 


1/4/18 15:00  89      


 


1/4/18 15:00  90  102/49 (66)    


 


1/4/18 14:55  90  103/48 (66)    


 


1/4/18 14:55  89      


 


1/4/18 14:50  90      


 


1/4/18 14:50  87  101/52 (68)    


 


1/4/18 14:45  81      


 


1/4/18 14:45  76  116/68 (84)    


 


1/4/18 14:44  75  116/78 (91)    


 


1/4/18 14:40  77      


 


1/4/18 14:39  78  102/40 (60)    


 


1/4/18 14:35  68      


 


1/4/18 14:30  66      


 


1/4/18 14:17  72  114/69 (84)    


 


1/4/18 14:16   18     








Objective Remarks


GENERAL: Well-nourished, well-developed patient.


CARDIOVASCULAR: Regular rate and rhythm without murmurs, gallops, or rubs. 


RESPIRATORY: Breath sounds equal bilaterally. No accessory muscle use.


ABDOMEN/GI: Abdomen soft, non-tender. 


   Fundus: Firm, non-tender at umbilicus.


GENITOURINARY: Light to moderate bleeding.


EXTREMITIES: No cyanosis or edema, non-tender, without signs of DVT.


Medications and IVs





Current Medications








 Medications


  (Trade)  Dose


 Ordered  Sig/Kiah


 Route  Start Time


 Stop Time Status Last Admin


 


 Lactated Ringer's  1,000 ml @ 


 125 mls/hr  Q8H


 IV  1/4/18 13:27


    1/4/18 15:35


 


 


 Lactated Ringer's  1,000 ml @ 


 3,000 mls/hr  Q20M PRN


 IV  1/4/18 13:27


     


 


 


 Sodium Chloride  500 ml @ 


 1,000 mls/hr  ONCE  PRN


 IV  1/4/18 13:30


 1/5/18 13:29   


 


 


 Sodium Chloride  1,000 ml @ 


 100 mls/hr  Q10H PRN


 IV  1/4/18 13:47


     


 


 


  (Xylocaine 1%


 Inj (50 ml))  0.1 ml  UNSCH X1  PRN


 I-DERMAL  1/4/18 13:30


 1/7/18 13:29   


 


 


  (Bicitra Liq)  30 ml  ON  CALL


 PO  1/4/18 13:30


 1/8/18 13:29   


 


 


  (fentaNYL INJ)  50 mcg  Q1H  PRN


 IV PUSH  1/4/18 13:30


     


 


 


  (fentaNYL INJ)  100 mcg  Q1H  PRN


 IV PUSH  1/4/18 13:30


    1/4/18 13:53


 


 


  (Xylocaine 1%


 Inj (50 ml))  10 ml  UNSCH X1  PRN


 INFIL  1/4/18 13:30


 1/6/18 13:29   


 


 


  (Muri-Lube Oil)  10 ml  UNSCH  PRN


 TOPICAL  1/4/18 13:30


     


 


 


 Oxytocin  500 ml @ 0


 mls/hr  TITRATE


 IV  1/4/18 13:30


    1/4/18 16:03


 


 


 Penicillin G


 Potassium [id number]


 units/Sodium


 Chloride  100 ml @ 


 200 mls/hr  Q4H


 IV  1/4/18 18:00


     


 


 


  (NS Flush)  2 ml  BID


 IV FLUSH  1/4/18 21:00


     


 


 


  (NS Flush)  2 ml  UNSCH  PRN


 IV FLUSH  1/4/18 18:00


     


 


 


  (Tylenol)  650 mg  Q4H  PRN


 PO  1/4/18 18:00


    1/5/18 02:54


 


 


  (Motrin)  800 mg  Q8H  PRN


 PO  1/4/18 18:00


    1/5/18 06:38


 


 


  (Americaine 20%


 Top Spr)  1 spray  Q4H  PRN


 TOPICAL  1/4/18 18:00


     


 


 


  (Tucks Pads)  1 applic  QID  PRN


 TOPICAL  1/4/18 18:00


     


 


 


  (Kareen-Colace)  2 tab  Q12H  PRN


 PO  1/4/18 18:00


     


 


 


  (Mag-Al Plus


 Susp Liq)  15 ml  Q8H  PRN


 PO  1/4/18 18:00


     


 


 


  (Zofran  Odt)  4 mg  Q6H  PRN


 PO  1/4/18 18:00


     


 


 


 Miscellaneous


 Information  No systemic


 narcotics to be


 given except...  UNSCH  PRN


 .XX  1/4/18 18:15


 1/5/18 18:14   


 


 


 Miscellaneous


 Information  DO NOT


 ADMINISTER ANY


 ANTICOAGUL...  UNSCH  PRN


 .XX  1/4/18 18:15


 1/5/18 18:14   


 


 


 Fentanyl/


 Bupivacaine HCl  100 ml @ 0


 mls/hr  TITRATE


 EPIDURAL  1/4/18 18:15


     


 


 


  (ePHEDrine/NS 25


 MG/5 ML SYR)  10 mg  UNSCH  PRN


 IV PUSH  1/4/18 18:15


 1/5/18 18:14   


 


 


  (Benadryl)  50 mg  Q4H  PRN


 PO  1/4/18 21:30


    1/4/18 22:05


 











Assessment/Plan


Assessment and Plan


ppd #1


Pt doing well


c/o lower back pain, we will add something stronger than motrin


pt bonding with infant


routine care


Discharge Planning


[facility] tomorrow











Lakesha Narayanan Jan 5, 2018 10:17

## 2018-01-05 NOTE — HHI.DS
Admission Date


2018 at 12:49


Discharge Date:  2018


Admitting Diagnosis





 


 term pregnancy at 39 weeks


induction of labor


Diagnosis:  


(1) Normal vaginal delivery


Diagnosis:  Principal


ICD Codes:  O80 - Encounter for full-term uncomplicated delivery


Delivery Date:  2018


Vaginal Delivery:  Normal


Infant:  Female


Brief History


This is a 32-year-old patient who has been miserable for the last 3 weeks with 

pelvic pressure and pelvic pain.  She is now over 39 weeks her cervix is 4 cm 

and she is requesting induction which I think is reasonable.





Hospital Course


pt admitted for induction, 39+ week pregnancy


[facility]


routine care


Pt Condition on Discharge:  Good


Discharge Disposition:  Discharge Home


Discharge Instructions


Diet Instructions:  As Tolerated, No Restrictions


Additional Diet Instructions:  


Drink at least 8 - 16 oz bottles of water a day


Activities You Can Perform:  Shower Only-No Bath, Sitz Bath


Activities to Avoid:  Lifting/Bending, Sexual Activity


Additional Activity Instruc.:  


No driving until off pain medications





Do not lift anything heavier than your baby in an infant carrier


Follow up Referrals:  


OB/GYN - 2 Weeks @ [facility]














Lakesha Narayanan 2018 10:20

## 2018-01-06 VITALS
SYSTOLIC BLOOD PRESSURE: 105 MMHG | RESPIRATION RATE: 16 BRPM | TEMPERATURE: 97.8 F | DIASTOLIC BLOOD PRESSURE: 73 MMHG | OXYGEN SATURATION: 98 % | HEART RATE: 73 BPM

## 2018-01-06 RX ADMIN — ACETAMINOPHEN PRN MG: 325 TABLET ORAL at 14:40

## 2018-01-06 RX ADMIN — IBUPROFEN PRN MG: 800 TABLET, FILM COATED ORAL at 16:47

## 2018-01-06 RX ADMIN — IBUPROFEN PRN MG: 800 TABLET, FILM COATED ORAL at 07:42

## 2018-02-16 ENCOUNTER — HOSPITAL ENCOUNTER (OUTPATIENT)
Dept: HOSPITAL 17 - CPRE | Age: 33
End: 2018-02-16
Attending: OBSTETRICS & GYNECOLOGY
Payer: MEDICAID

## 2018-02-16 DIAGNOSIS — Z01.812: Primary | ICD-10-CM

## 2018-02-16 DIAGNOSIS — Z30.2: ICD-10-CM

## 2018-02-16 LAB
BUN SERPL-MCNC: 12 MG/DL (ref 7–18)
CALCIUM SERPL-MCNC: 8.6 MG/DL (ref 8.5–10.1)
CHLORIDE SERPL-SCNC: 109 MEQ/L (ref 98–107)
CREAT SERPL-MCNC: 0.83 MG/DL (ref 0.5–1)
ERYTHROCYTE [DISTWIDTH] IN BLOOD BY AUTOMATED COUNT: 14.3 % (ref 11.6–17.2)
GFR SERPLBLD BASED ON 1.73 SQ M-ARVRAT: 96 ML/MIN (ref 89–?)
HCO3 BLD-SCNC: 29.1 MEQ/L (ref 21–32)
HCT VFR BLD CALC: 37 % (ref 35–46)
HGB BLD-MCNC: 12.2 GM/DL (ref 11.6–15.3)
MCH RBC QN AUTO: 29.5 PG (ref 27–34)
MCHC RBC AUTO-ENTMCNC: 32.8 % (ref 32–36)
MCV RBC AUTO: 89.9 FL (ref 80–100)
PLATELET # BLD: 324 TH/MM3 (ref 150–450)
PMV BLD AUTO: 8.9 FL (ref 7–11)
RBC # BLD AUTO: 4.12 MIL/MM3 (ref 4–5.3)
SODIUM SERPL-SCNC: 143 MEQ/L (ref 136–145)
WBC # BLD AUTO: 9.5 TH/MM3 (ref 4–11)

## 2018-02-16 PROCEDURE — 36415 COLL VENOUS BLD VENIPUNCTURE: CPT

## 2018-02-16 PROCEDURE — 85027 COMPLETE CBC AUTOMATED: CPT

## 2018-02-16 PROCEDURE — 84703 CHORIONIC GONADOTROPIN ASSAY: CPT

## 2018-02-16 PROCEDURE — 80048 BASIC METABOLIC PNL TOTAL CA: CPT

## 2018-02-21 ENCOUNTER — HOSPITAL ENCOUNTER (OUTPATIENT)
Dept: HOSPITAL 17 - HSDC | Age: 33
Discharge: HOME | End: 2018-02-21
Attending: OBSTETRICS & GYNECOLOGY
Payer: MEDICAID

## 2018-02-21 VITALS
RESPIRATION RATE: 18 BRPM | SYSTOLIC BLOOD PRESSURE: 119 MMHG | HEART RATE: 54 BPM | TEMPERATURE: 97.7 F | DIASTOLIC BLOOD PRESSURE: 73 MMHG | OXYGEN SATURATION: 97 %

## 2018-02-21 VITALS — BODY MASS INDEX: 42.29 KG/M2 | HEIGHT: 60 IN | WEIGHT: 215.39 LBS

## 2018-02-21 DIAGNOSIS — Z30.2: Primary | ICD-10-CM

## 2018-02-21 PROCEDURE — 88302 TISSUE EXAM BY PATHOLOGIST: CPT

## 2018-02-21 PROCEDURE — 00840 ANES IPER PX LOWER ABD NOS: CPT

## 2018-02-21 PROCEDURE — 58661 LAPAROSCOPY REMOVE ADNEXA: CPT

## 2018-02-26 NOTE — MP
cc:

GOPAL GRAF

****

 

 

DATE OF SURGERY: 02/21/2018.

 

PREOPERATIVE DIAGNOSIS:

Desires permanent sterilization.

 

POSTOPERATIVE DIAGNOSIS:

Desires permanent sterilization.

 

OPERATIVE PROCEDURE PERFORMED:

Laparoscopic bilateral salpingectomy.

 

SURGEON:

GOPAL Graf MD.

 

ANESTHESIA:

General.

 

FINDINGS:

On examination under anesthesia, the vagina was clean.  Cervix was clean.

Uterus normal size, shape and consistency.  Adnexa negative for masses.  The

laparoscopic exam revealed normal ovaries, normal tubes, normal uterus, normal

anterior and posterior cul-de-sacs.

 

COMPLICATIONS:

None.

 

COUNTS:

Correct.

 

ESTIMATED BLOOD LOSS:

Minimal.

 

DISPOSITION:

The patient tolerated the procedure well and went to the recovery room in good

condition.

 

DESCRIPTION OF THE PROCEDURE IN DETAIL:

The patient was taken to the operating room and identified by name band and

verbally and given a general anesthetic and carefully placed in the dorsal

lithotomy position and prepped and draped in the usual sterile manner for

laparoscopic surgery.  An in-and-out catheter was used to drain her urinary

bladder.

 

An examination under anesthesia was carried out and a weighted speculum was

placed in the vagina.  The anterior lip of the cervix was grasped with a

single-tooth tenaculum.  The cervix was serially dilated and a sharp curetting

followed.  Hulka clamp was placed and attention was turned to the umbilical

area.

 

A small subumbilical incision was made with a 5 mm trocar and a

pneumoperitoneum was created with two liters of CO2.  Inferolateral to the

umbilicus bilaterally we put two more 5 mm trocars in and began at the

fimbriated end of the left fallopian tube.  Going along the mesosalpinx of the

fallopian tube, we removed the entire fallopian tube with the Harmonic scalpel.

It was removed through the 5-mm port. This was repeated on the opposite side.

Hemostasis was excellent.  The air was released. The laparoscope was removed

under direct vision and the incisions were repaired with a 4-0 Monocryl in

subcuticular manner.

 

She tolerated procedure well and went to the recovery room in good condition.

 

 

 

                              _________________________________

                              MD CEDRIC Humphreys/YAZMIN

D:  2/23/2018/9:57 AM

T:  2/26/2018/9:41 AM

Visit #:  [id number]

Job #:  [id number]

## 2018-02-26 NOTE — MP
cc:

GOPAL Graf MD

****

 

 

DATE OF OPERATION:

02/21/2018

 

PREOPERATIVE DIAGNOSIS:  Desires permanent sterilization.

 

POSTOPERATIVE DIAGNOSIS: Desires permanent sterilization.

 

PROCEDURE:  Laparoscopic bilateral salpingectomy

 

ANESTHESIA:  General endotracheal intubation

 

SURGEON:  GOPAL Graf MD

 

FINDINGS:  Examination under anesthesia, the vagina was clean, the 

cervix was clean.  Uterus normal size and shape and in consistency and

freely mobile.  The adnexa were without masses.  Laparoscopic exam 

revealed normal tubes, normal ovaries, normal uterus, normal 

anterior/posterior cul-de-sac.  Both tubes were completely removed.

 

COMPLICATIONS:  None

 

COUNTS:  Correct.

 

ESTIMATED BLOOD LOSS:  Minimal

 

DISPOSITION:  The patient tolerated the procedure well, went to the 

recovery room in good condition.

 

PROCEDURE IN DETAIL:  The patient was taken to the operating room, 

identified by name band and verbally given a general anesthetic, 

prepped and draped in the usual sterile manner in the dorsal lithotomy

position for a laparoscopic procedure.  Examination under anesthesia 

was carried out with the above findings and a weighted speculum was 

placed in the vagina.  The anterior lip of the cervix was grasped with

a single toothed tenaculum.  Cervix serially dilated.  A Hulka clamp 

was placed.  Attention was turned to the umbilical area.

 

A small subumbilical incision was made and a 5 mm trocar was used to 

create a pneumoperitoneum upon entering the abdomen.  Inferolateral to

the umbilicus bilaterally, we placed two more 5 mm trocars.  We began 

with the patients left tube, grabbed the fimbriated end, take down by 

the mesosalpinx with a Harmonic scalpel to the level of the uterus and

the tube was completely removed through the 5 mm port.  This was 

repeated on the contralateral side.  Hemostasis was excellent.  

Pictures were taken.

 

She tolerated the procedure well.  The scope was removed under direct 

vision.  The air was released through the second and third puncture 

and the skin was repaired with 4-0 Monocryl subcuticular fashion.

 

 

 

 

__________________________________

GOPAL Graf MD

 

 

RJV/DL/rr

D: 02/26/2018, 09:57 AM

T: 02/26/2018, 10:22 AM

Visit #: [id number]

Job #: [id number]